# Patient Record
Sex: MALE | Race: WHITE | NOT HISPANIC OR LATINO | Employment: UNEMPLOYED | URBAN - METROPOLITAN AREA
[De-identification: names, ages, dates, MRNs, and addresses within clinical notes are randomized per-mention and may not be internally consistent; named-entity substitution may affect disease eponyms.]

---

## 2023-08-30 PROBLEM — G47.00 INSOMNIA: Status: ACTIVE | Noted: 2023-08-30

## 2023-08-30 PROBLEM — E03.9 HYPOTHYROIDISM: Status: ACTIVE | Noted: 2023-08-30

## 2023-08-30 PROBLEM — K63.89 EPIPLOIC APPENDAGITIS: Status: ACTIVE | Noted: 2023-08-30

## 2023-08-30 PROBLEM — M79.2 NEURALGIA INVOLVING SCALP: Status: ACTIVE | Noted: 2023-08-30

## 2023-08-30 PROBLEM — I63.9 CEREBROVASCULAR ACCIDENT (MULTI): Status: ACTIVE | Noted: 2023-08-30

## 2023-08-30 PROBLEM — E55.9 VITAMIN D DEFICIENCY: Status: ACTIVE | Noted: 2023-08-30

## 2023-08-30 PROBLEM — R93.89 ABNORMAL CAT SCAN: Status: ACTIVE | Noted: 2023-08-30

## 2023-08-30 PROBLEM — F32.A ANXIETY AND DEPRESSION: Status: ACTIVE | Noted: 2023-08-30

## 2023-08-30 PROBLEM — R80.9 PROTEINURIA: Status: ACTIVE | Noted: 2023-08-30

## 2023-08-30 PROBLEM — I10 HYPERTENSION: Status: ACTIVE | Noted: 2023-08-30

## 2023-08-30 PROBLEM — R91.1 PULMONARY NODULE: Status: ACTIVE | Noted: 2023-08-30

## 2023-08-30 PROBLEM — F41.9 ANXIETY AND DEPRESSION: Status: ACTIVE | Noted: 2023-08-30

## 2023-08-30 PROBLEM — E10.9 DIABETES MELLITUS TYPE 1 (MULTI): Status: ACTIVE | Noted: 2023-08-30

## 2023-08-30 PROBLEM — E78.5 HYPERLIPIDEMIA: Status: ACTIVE | Noted: 2023-08-30

## 2023-08-30 PROBLEM — E78.01 ESSENTIAL FAMILIAL HYPERCHOLESTEROLEMIA: Status: ACTIVE | Noted: 2023-08-30

## 2023-08-30 RX ORDER — INSULIN LISPRO 100 [IU]/ML
INJECTION, SOLUTION INTRAVENOUS; SUBCUTANEOUS
COMMUNITY
Start: 2021-11-03

## 2023-08-30 RX ORDER — BLOOD SUGAR DIAGNOSTIC
STRIP MISCELLANEOUS 4 TIMES DAILY
COMMUNITY
Start: 2017-06-19

## 2023-08-30 RX ORDER — INSULIN DEGLUDEC 100 U/ML
INJECTION, SOLUTION SUBCUTANEOUS NIGHTLY
COMMUNITY
End: 2024-02-13 | Stop reason: CLARIF

## 2023-08-30 RX ORDER — NIRMATRELVIR AND RITONAVIR 300-100 MG
KIT ORAL
COMMUNITY
Start: 2022-12-01 | End: 2024-05-28 | Stop reason: WASHOUT

## 2023-08-30 RX ORDER — KETOROLAC TROMETHAMINE 10 MG/1
TABLET, FILM COATED ORAL
COMMUNITY
Start: 2023-02-25 | End: 2024-05-28 | Stop reason: WASHOUT

## 2023-08-30 RX ORDER — LEVOTHYROXINE SODIUM 137 UG/1
1 TABLET ORAL DAILY
COMMUNITY
Start: 2016-08-02

## 2023-08-30 RX ORDER — POLYETHYLENE GLYCOL 3350, SODIUM CHLORIDE, SODIUM BICARBONATE, POTASSIUM CHLORIDE 420; 11.2; 5.72; 1.48 G/4L; G/4L; G/4L; G/4L
POWDER, FOR SOLUTION ORAL
COMMUNITY
Start: 2022-09-20

## 2023-08-30 RX ORDER — BENZONATATE 200 MG/1
CAPSULE ORAL
COMMUNITY
Start: 2022-12-01

## 2023-08-30 RX ORDER — INSULIN GLARGINE 100 [IU]/ML
INJECTION, SOLUTION SUBCUTANEOUS
COMMUNITY
Start: 2021-08-31 | End: 2024-02-12 | Stop reason: SDUPTHER

## 2023-08-30 RX ORDER — ATORVASTATIN CALCIUM 40 MG/1
1 TABLET, FILM COATED ORAL NIGHTLY
COMMUNITY
Start: 2012-12-18

## 2023-08-30 RX ORDER — ACETAMINOPHEN 500 MG
TABLET ORAL
COMMUNITY

## 2023-08-30 RX ORDER — URINE ACETONE TEST STRIPS
STRIP MISCELLANEOUS
COMMUNITY
Start: 2016-04-18

## 2023-10-09 ENCOUNTER — APPOINTMENT (OUTPATIENT)
Dept: ENDOCRINOLOGY | Facility: CLINIC | Age: 48
End: 2023-10-09

## 2024-02-12 DIAGNOSIS — E10.9 TYPE 1 DIABETES MELLITUS WITHOUT COMPLICATION (MULTI): Primary | ICD-10-CM

## 2024-02-12 RX ORDER — INSULIN GLARGINE 100 [IU]/ML
35 INJECTION, SOLUTION SUBCUTANEOUS NIGHTLY
Qty: 45 ML | Refills: 3 | Status: SHIPPED | OUTPATIENT
Start: 2024-02-12 | End: 2024-02-13 | Stop reason: CLARIF

## 2024-02-13 DIAGNOSIS — E10.9 TYPE 1 DIABETES MELLITUS WITHOUT COMPLICATION (MULTI): Primary | ICD-10-CM

## 2024-02-13 RX ORDER — INSULIN GLARGINE-YFGN 100 [IU]/ML
35 INJECTION, SOLUTION SUBCUTANEOUS NIGHTLY
Qty: 45 ML | Refills: 3 | Status: SHIPPED | OUTPATIENT
Start: 2024-02-13 | End: 2025-07-11

## 2024-04-29 ENCOUNTER — APPOINTMENT (OUTPATIENT)
Dept: RADIOLOGY | Facility: HOSPITAL | Age: 49
End: 2024-04-29
Payer: COMMERCIAL

## 2024-04-29 ENCOUNTER — APPOINTMENT (OUTPATIENT)
Dept: CARDIOLOGY | Facility: HOSPITAL | Age: 49
End: 2024-04-29
Payer: COMMERCIAL

## 2024-04-29 ENCOUNTER — HOSPITAL ENCOUNTER (EMERGENCY)
Facility: HOSPITAL | Age: 49
Discharge: HOME | End: 2024-04-29
Payer: COMMERCIAL

## 2024-04-29 VITALS
DIASTOLIC BLOOD PRESSURE: 83 MMHG | SYSTOLIC BLOOD PRESSURE: 112 MMHG | HEART RATE: 67 BPM | HEIGHT: 67 IN | BODY MASS INDEX: 35.31 KG/M2 | WEIGHT: 225 LBS | TEMPERATURE: 98.4 F | OXYGEN SATURATION: 97 % | RESPIRATION RATE: 16 BRPM

## 2024-04-29 DIAGNOSIS — R53.83 OTHER FATIGUE: Primary | ICD-10-CM

## 2024-04-29 LAB
ALBUMIN SERPL-MCNC: 4.2 G/DL (ref 3.5–5)
ALP BLD-CCNC: 84 U/L (ref 35–125)
ALT SERPL-CCNC: 26 U/L (ref 5–40)
ANION GAP SERPL CALC-SCNC: 9 MMOL/L
APPEARANCE UR: CLEAR
AST SERPL-CCNC: 19 U/L (ref 5–40)
BASOPHILS # BLD AUTO: 0.06 X10*3/UL (ref 0–0.1)
BASOPHILS NFR BLD AUTO: 0.8 %
BILIRUB SERPL-MCNC: 0.7 MG/DL (ref 0.1–1.2)
BILIRUB UR STRIP.AUTO-MCNC: NEGATIVE MG/DL
BUN SERPL-MCNC: 10 MG/DL (ref 8–25)
CALCIUM SERPL-MCNC: 9.7 MG/DL (ref 8.5–10.4)
CHLORIDE SERPL-SCNC: 102 MMOL/L (ref 97–107)
CO2 SERPL-SCNC: 27 MMOL/L (ref 24–31)
COLOR UR: ABNORMAL
CREAT SERPL-MCNC: 0.8 MG/DL (ref 0.4–1.6)
EGFRCR SERPLBLD CKD-EPI 2021: >90 ML/MIN/1.73M*2
EOSINOPHIL # BLD AUTO: 0.29 X10*3/UL (ref 0–0.7)
EOSINOPHIL NFR BLD AUTO: 4 %
ERYTHROCYTE [DISTWIDTH] IN BLOOD BY AUTOMATED COUNT: 12.6 % (ref 11.5–14.5)
FLUAV RNA RESP QL NAA+PROBE: NOT DETECTED
FLUBV RNA RESP QL NAA+PROBE: NOT DETECTED
GLUCOSE BLD MANUAL STRIP-MCNC: 289 MG/DL (ref 74–99)
GLUCOSE SERPL-MCNC: 311 MG/DL (ref 65–99)
GLUCOSE UR STRIP.AUTO-MCNC: ABNORMAL MG/DL
HCT VFR BLD AUTO: 52.3 % (ref 41–52)
HGB BLD-MCNC: 17.1 G/DL (ref 13.5–17.5)
IMM GRANULOCYTES # BLD AUTO: 0.02 X10*3/UL (ref 0–0.7)
IMM GRANULOCYTES NFR BLD AUTO: 0.3 % (ref 0–0.9)
KETONES UR STRIP.AUTO-MCNC: ABNORMAL MG/DL
LEUKOCYTE ESTERASE UR QL STRIP.AUTO: NEGATIVE
LIPASE SERPL-CCNC: 27 U/L (ref 16–63)
LYMPHOCYTES # BLD AUTO: 1.55 X10*3/UL (ref 1.2–4.8)
LYMPHOCYTES NFR BLD AUTO: 21.1 %
MCH RBC QN AUTO: 29.7 PG (ref 26–34)
MCHC RBC AUTO-ENTMCNC: 32.7 G/DL (ref 32–36)
MCV RBC AUTO: 91 FL (ref 80–100)
MONOCYTES # BLD AUTO: 0.5 X10*3/UL (ref 0.1–1)
MONOCYTES NFR BLD AUTO: 6.8 %
NEUTROPHILS # BLD AUTO: 4.91 X10*3/UL (ref 1.2–7.7)
NEUTROPHILS NFR BLD AUTO: 67 %
NITRITE UR QL STRIP.AUTO: NEGATIVE
NRBC BLD-RTO: 0 /100 WBCS (ref 0–0)
PH UR STRIP.AUTO: 6 [PH]
PLATELET # BLD AUTO: 255 X10*3/UL (ref 150–450)
POTASSIUM SERPL-SCNC: 4.2 MMOL/L (ref 3.4–5.1)
PROT SERPL-MCNC: 7 G/DL (ref 5.9–7.9)
PROT UR STRIP.AUTO-MCNC: NEGATIVE MG/DL
RBC # BLD AUTO: 5.75 X10*6/UL (ref 4.5–5.9)
RBC # UR STRIP.AUTO: NEGATIVE /UL
SARS-COV-2 RNA RESP QL NAA+PROBE: NOT DETECTED
SODIUM SERPL-SCNC: 138 MMOL/L (ref 133–145)
SP GR UR STRIP.AUTO: 1.03
TROPONIN T SERPL-MCNC: <6 NG/L
UROBILINOGEN UR STRIP.AUTO-MCNC: NORMAL MG/DL
WBC # BLD AUTO: 7.3 X10*3/UL (ref 4.4–11.3)

## 2024-04-29 PROCEDURE — 87636 SARSCOV2 & INF A&B AMP PRB: CPT | Performed by: NURSE PRACTITIONER

## 2024-04-29 PROCEDURE — 96361 HYDRATE IV INFUSION ADD-ON: CPT

## 2024-04-29 PROCEDURE — 93005 ELECTROCARDIOGRAM TRACING: CPT

## 2024-04-29 PROCEDURE — 85025 COMPLETE CBC W/AUTO DIFF WBC: CPT | Performed by: NURSE PRACTITIONER

## 2024-04-29 PROCEDURE — 81003 URINALYSIS AUTO W/O SCOPE: CPT | Performed by: NURSE PRACTITIONER

## 2024-04-29 PROCEDURE — 96374 THER/PROPH/DIAG INJ IV PUSH: CPT

## 2024-04-29 PROCEDURE — 99284 EMERGENCY DEPT VISIT MOD MDM: CPT | Mod: 25

## 2024-04-29 PROCEDURE — 71046 X-RAY EXAM CHEST 2 VIEWS: CPT

## 2024-04-29 PROCEDURE — 82947 ASSAY GLUCOSE BLOOD QUANT: CPT | Mod: 59

## 2024-04-29 PROCEDURE — 36415 COLL VENOUS BLD VENIPUNCTURE: CPT | Performed by: NURSE PRACTITIONER

## 2024-04-29 PROCEDURE — 2500000004 HC RX 250 GENERAL PHARMACY W/ HCPCS (ALT 636 FOR OP/ED): Performed by: NURSE PRACTITIONER

## 2024-04-29 PROCEDURE — 83690 ASSAY OF LIPASE: CPT | Performed by: NURSE PRACTITIONER

## 2024-04-29 PROCEDURE — 84484 ASSAY OF TROPONIN QUANT: CPT | Performed by: NURSE PRACTITIONER

## 2024-04-29 PROCEDURE — 80053 COMPREHEN METABOLIC PANEL: CPT | Performed by: NURSE PRACTITIONER

## 2024-04-29 PROCEDURE — 71046 X-RAY EXAM CHEST 2 VIEWS: CPT | Performed by: RADIOLOGY

## 2024-04-29 RX ORDER — ONDANSETRON HYDROCHLORIDE 2 MG/ML
4 INJECTION, SOLUTION INTRAVENOUS ONCE
Status: COMPLETED | OUTPATIENT
Start: 2024-04-29 | End: 2024-04-29

## 2024-04-29 RX ADMIN — SODIUM CHLORIDE 1000 ML: 900 INJECTION, SOLUTION INTRAVENOUS at 16:02

## 2024-04-29 RX ADMIN — ONDANSETRON 4 MG: 2 INJECTION INTRAMUSCULAR; INTRAVENOUS at 16:02

## 2024-04-29 ASSESSMENT — PAIN SCALES - GENERAL
PAINLEVEL_OUTOF10: 0 - NO PAIN
PAINLEVEL_OUTOF10: 0 - NO PAIN

## 2024-04-29 ASSESSMENT — COLUMBIA-SUICIDE SEVERITY RATING SCALE - C-SSRS
2. HAVE YOU ACTUALLY HAD ANY THOUGHTS OF KILLING YOURSELF?: NO
6. HAVE YOU EVER DONE ANYTHING, STARTED TO DO ANYTHING, OR PREPARED TO DO ANYTHING TO END YOUR LIFE?: NO
1. IN THE PAST MONTH, HAVE YOU WISHED YOU WERE DEAD OR WISHED YOU COULD GO TO SLEEP AND NOT WAKE UP?: NO

## 2024-04-29 ASSESSMENT — PAIN - FUNCTIONAL ASSESSMENT: PAIN_FUNCTIONAL_ASSESSMENT: 0-10

## 2024-04-29 NOTE — ED PROVIDER NOTES
HPI   Chief Complaint   Patient presents with    Weakness, Gen     Pt type 1 diabetic not feeling well for past few days, had syncopal episode at work bs is 289       HPI  See my MDM                  [place] Coma Scale Score: 15                     Patient History   Past Medical History:   Diagnosis Date    Acute upper respiratory infection, unspecified 10/21/2015    Acute upper respiratory infection    Personal history of other diseases of the digestive system 12/11/2018    History of gastroenteritis    Personal history of other diseases of the respiratory system 01/18/2016    History of acute sinusitis    Personal history of other diseases of the respiratory system 02/18/2017    History of influenza    Personal history of other diseases of the respiratory system 02/16/2015    History of allergic rhinitis    Personal history of other specified conditions 04/03/2018    History of fatigue     Past Surgical History:   Procedure Laterality Date    MR NECK ANGIO WO IV CONTRAST  11/16/2022    MR NECK ANGIO WO IV CONTRAST [facility]     Family History   Problem Relation Name Age of Onset    Hyperlipidemia Mother      Hyperlipidemia Father      Bipolar disorder Brother      Alcohol abuse Other multiple family members      Social History     Tobacco Use    Smoking status: Not on file    Smokeless tobacco: Not on file   Substance Use Topics    Alcohol use: Not on file    Drug use: Not on file       Physical Exam   ED Triage Vitals [04/29/24 1539]   Temperature Heart Rate Respirations BP   37 °C (98.6 °F) 72 16 116/89      Pulse Ox Temp Source Heart Rate Source Patient Position   98 % Oral Monitor Sitting      BP Location FiO2 (%)     Right arm --       Physical Exam  CONSTITUTIONAL: Vital signs reviewed as charted, well-developed and in no distress  Eyes: Extraocular muscles are intact. Pupils equal round and reactive to light. Conjunctiva are pink.    ENT: Mucous membranes are moist. Tongue in the midline. Pharynx  was without erythema or exudates, uvula midline  LUNGS: Breath sounds equal and clear to auscultation. Good air exchange, no wheezes rales or retractions, pulse oximetry is charted.  HEART: Regular rate and rhythm without murmur thrill or rub, strong tones, auscultation is normal.  ABDOMEN: Soft and nontender without guarding rebound rigidity or mass. Bowel sounds are present and normal in all quadrants. There is no palpable masses or aneurysms identified. No hepatosplenomegaly, normal abdominal exam.  Neuro: The patient is awake, alert and oriented ×3. Moving all 4 extremities and answering questions appropriately.   MUSCULOSKELETAL: The calves are nontender to palpation. Full gross active range of motion.   PSYCH: Awake alert oriented, normal mood and affect.  Skin:  Dry, normal color, warm to the touch, no rash present.      ED Course & MDM   ED Course as of 04/29/24 1816 Mon Apr 29, 2024   1546 EKG on my independent interpretation: Normal sinus rhythm 68 bpm, normal axis, normal intervals, no acute ST or T wave abnormalities [ABDELRAHMAN]      ED Course User Index  [ABDELRAHMAN] Malka Lora MD         Diagnoses as of 04/29/24 1816   Other fatigue       Medical Decision Making  History obtained from: patient    Vital signs, nursing notes, current medications, past medical history, Surgical history, allergies, social history, family History were reviewed.         HPI:  Patient 48-year-old male history of diabetes mellitus presenting emergency room today 3-day history of general fatigue.  Denies fever chills or night sweats.  Denies nausea vomiting diarrhea.  Denies cough or congestion.  Denies urinary complaints.  Vital signs within normal limits.  Nontoxic-appearing      10 point ROS was reviewed and negative except Noted above in HPI.  DDX: as listed above          MDM Summary/considerations:  EMERGENCY DEPARTMENT COURSE and DIFFERENTIAL DIAGNOSIS/MDM:        The patient presented with a chief complaint of fatigue. The  "differential diagnosis associated with this patient's presentation includes infection, viral syndrome, electrolyte abnormality.       Vitals:    Vitals:    04/29/24 1539 04/29/24 1813   BP: 116/89 112/83   BP Location: Right arm    Patient Position: Sitting    Pulse: 72 67   Resp: 16 16   Temp: 37 °C (98.6 °F) 36.9 °C (98.4 °F)   TempSrc: Oral    SpO2: 98% 97%   Weight: 102 kg (225 lb)    Height: 1.702 m (5' 7\")        ED Course as of 04/29/24 1816 Mon Apr 29, 2024   1546 EKG on my independent interpretation: Normal sinus rhythm 68 bpm, normal axis, normal intervals, no acute ST or T wave abnormalities [ABDELRAHMAN]      ED Course User Index  [ABDELRAHMAN] Malka Lora MD         Diagnoses as of 04/29/24 1816   Other fatigue       History Limited by:    None    Independent history obtained from:    None      External records reviewed:    None      Diagnostics interpreted by me:    EKG interpreted by my attending physician and Xray(s) of the chest      Discussions with other clinicians:    None      Chronic conditions impacting care:    None      Social determinants of health affecting care:    None    Diagnostic tests considered but not performed: none    ED Medications managed:    Medications   sodium chloride 0.9 % bolus 1,000 mL (0 mL intravenous Stopped 4/29/24 1702)   ondansetron (Zofran) injection 4 mg (4 mg intravenous Given 4/29/24 1602)         Prescription drugs considered:    None    Labs Reviewed   CBC WITH AUTO DIFFERENTIAL - Abnormal       Result Value    WBC 7.3      nRBC 0.0      RBC 5.75      Hemoglobin 17.1      Hematocrit 52.3 (*)     MCV 91      MCH 29.7      MCHC 32.7      RDW 12.6      Platelets 255      Neutrophils % 67.0      Immature Granulocytes %, Automated 0.3      Lymphocytes % 21.1      Monocytes % 6.8      Eosinophils % 4.0      Basophils % 0.8      Neutrophils Absolute 4.91      Immature Granulocytes Absolute, Automated 0.02      Lymphocytes Absolute 1.55      Monocytes Absolute 0.50      " Eosinophils Absolute 0.29      Basophils Absolute 0.06     COMPREHENSIVE METABOLIC PANEL - Abnormal    Glucose 311 (*)     Sodium 138      Potassium 4.2      Chloride 102      Bicarbonate 27      Urea Nitrogen 10      Creatinine 0.80      eGFR >90      Calcium 9.7      Albumin 4.2      Alkaline Phosphatase 84      Total Protein 7.0      AST 19      Bilirubin, Total 0.7      ALT 26      Anion Gap 9     URINALYSIS WITH REFLEX MICROSCOPIC - Abnormal    Color, Urine Light-Yellow      Appearance, Urine Clear      Specific Gravity, Urine 1.031      pH, Urine 6.0      Protein, Urine NEGATIVE      Glucose, Urine OVER (4+) (*)     Blood, Urine NEGATIVE      Ketones, Urine TRACE (*)     Bilirubin, Urine NEGATIVE      Urobilinogen, Urine Normal      Nitrite, Urine NEGATIVE      Leukocyte Esterase, Urine NEGATIVE     POCT GLUCOSE - Abnormal    POCT Glucose 289 (*)    LIPASE - Normal    Lipase 27     SARS-COV-2 PCR - Normal    Coronavirus 2019, PCR Not Detected      Narrative:     This assay has received FDA Emergency Use Authorization (EUA) and is only authorized for the duration of time that circumstances exist to justify the authorization of the emergency use of in vitro diagnostic tests for the detection of SARS-CoV-2 virus and/or diagnosis of COVID-19 infection under section 564(b)(1) of the Act, 21 U.S.C. 360bbb-3(b)(1). This assay is an in vitro diagnostic nucleic acid amplification test for the qualitative detection of SARS-CoV-2 from nasopharyngeal specimens and has been validated for use at [facility]. Negative results do not preclude COVID-19 infections and should not be used as the sole basis for diagnosis, treatment, or other management decisions.     INFLUENZA A AND B PCR - Normal    Flu A Result Not Detected      Flu B Result Not Detected      Narrative:     This assay is an in vitro diagnostic multiplex nucleic acid amplification test for the detection and discrimination of Influenza A & B  from nasopharyngeal specimens, and has been validated for use at [facility]. Negative results do not preclude Influenza A/B infections, and should not be used as the sole basis for diagnosis, treatment, or other management decisions. If Influenza A/B and RSV PCR results are negative, testing for Parainfluenza virus, Adenovirus and Metapneumovirus is routinely performed for [facility] pediatric oncology and intensive care inpatients, and is available on other patients by placing an add-on request.   SERIAL TROPONIN, INITIAL ([facility]) - Normal    Troponin T, High Sensitivity <6     TROPONIN T SERIES, HIGH SENSITIVITY (0, 2 HR, 6 HR)    Narrative:     The following orders were created for panel order Troponin T Series, High Sensitivity (0, 2HR, 6HR).  Procedure                               Abnormality         Status                     ---------                               -----------         ------                     Serial Troponin, Initial...[[id number]]  Normal              Final result               Serial Troponin, 2 Hour ...[[id number]]                                                   Please view results for these tests on the individual orders.   SERIAL TROPONIN,  2 HOUR ([facility])     XR chest 2 views   Final Result   1.  No evidence of acute cardiopulmonary process.                  MACRO:   None        Signed by: Edilia Brooks 4/29/2024 4:15 PM   Dictation workstation:   [id number]        Medications   sodium chloride 0.9 % bolus 1,000 mL (0 mL intravenous Stopped 4/29/24 1702)   ondansetron (Zofran) injection 4 mg (4 mg intravenous Given 4/29/24 1602)     Discharge Medication List as of 4/29/2024  6:14 PM        I estimate there is LOW risk for EPIGLOTTITIS, PNEUMONIA, MENINGITIS, OR URINARY TRACT INFECTION, thus I consider the discharge disposition reasonable. Also, there is no evidence for peritonitis, sepsis, or toxicity. We have discussed the diagnosis and risks, and we agree with  discharging home to follow-up with their primary doctor. We also discussed returning to the Emergency Department immediately if new or worsening symptoms occur. We have discussed the symptoms which are most concerning (e.g., changing or worsening pain, trouble swallowing or breathing, neck stiffness, fever) that necessitate immediate return.    Patient's laboratory assessment was grossly unremarkable including electrolytes and blood counts.  Negative urinalysis.  Negative chest x-ray completed here in the ED nonischemic EKG, negative troponin.  Patient be discharged home to follow-up PCP 1 to 2 days for reevaluation.  Was feeling somewhat better after the IV fluids.    All of the patient's questions were answered to the best of my ability.  Patient states understanding that they have been screened for an emergency today and we have not found any etiology of symptoms that requires emergent treatment or admission to the hospital at this point. They understand that they have not had definitive care day and require follow-up for treatment of their condition. They also state understanding that they may have an emergent condition that may potentially have not of detected at this visit and they must return to the emergency department if they develop any worsening of symptoms or new complaints.      Discussed H&P with supervising physician, aware of results and agrees with plan/ disposition.        Critical Care: Not warranted at this time        This chart was completed using voice recognition transcription software. Please excuse any errors of transcription including grammatical, punctuation, syntax and spelling errors.  Please contact me with any questions regarding this chart.    Procedure  Procedures     AUGIE Quintanilla-CNP  04/29/24 3301

## 2024-05-01 ENCOUNTER — LAB (OUTPATIENT)
Dept: LAB | Facility: LAB | Age: 49
End: 2024-05-01
Payer: COMMERCIAL

## 2024-05-01 ENCOUNTER — OFFICE VISIT (OUTPATIENT)
Dept: PRIMARY CARE | Facility: CLINIC | Age: 49
End: 2024-05-01
Payer: COMMERCIAL

## 2024-05-01 VITALS
HEIGHT: 67 IN | DIASTOLIC BLOOD PRESSURE: 72 MMHG | BODY MASS INDEX: 32.02 KG/M2 | SYSTOLIC BLOOD PRESSURE: 124 MMHG | WEIGHT: 204 LBS

## 2024-05-01 DIAGNOSIS — E03.9 HYPOTHYROIDISM, UNSPECIFIED TYPE: ICD-10-CM

## 2024-05-01 DIAGNOSIS — I10 PRIMARY HYPERTENSION: ICD-10-CM

## 2024-05-01 DIAGNOSIS — R55 SYNCOPE, UNSPECIFIED SYNCOPE TYPE: ICD-10-CM

## 2024-05-01 DIAGNOSIS — F43.9 STRESS: Primary | ICD-10-CM

## 2024-05-01 DIAGNOSIS — E10.9 TYPE 1 DIABETES MELLITUS WITHOUT COMPLICATION (MULTI): ICD-10-CM

## 2024-05-01 DIAGNOSIS — Z12.5 ENCOUNTER FOR PROSTATE CANCER SCREENING: ICD-10-CM

## 2024-05-01 DIAGNOSIS — F41.9 ANXIETY: ICD-10-CM

## 2024-05-01 DIAGNOSIS — E78.49 OTHER HYPERLIPIDEMIA: ICD-10-CM

## 2024-05-01 LAB
ALBUMIN SERPL BCP-MCNC: 4.1 G/DL (ref 3.4–5)
ALP SERPL-CCNC: 72 U/L (ref 33–120)
ALT SERPL W P-5'-P-CCNC: 28 U/L (ref 10–52)
AMMONIA PLAS-SCNC: 19 UMOL/L (ref 16–53)
ANION GAP SERPL CALC-SCNC: 10 MMOL/L (ref 10–20)
AST SERPL W P-5'-P-CCNC: 20 U/L (ref 9–39)
BILIRUB SERPL-MCNC: 0.6 MG/DL (ref 0–1.2)
BUN SERPL-MCNC: 9 MG/DL (ref 6–23)
CALCIUM SERPL-MCNC: 9.7 MG/DL (ref 8.6–10.6)
CHLORIDE SERPL-SCNC: 107 MMOL/L (ref 98–107)
CHOLEST SERPL-MCNC: 143 MG/DL (ref 0–199)
CHOLESTEROL/HDL RATIO: 2.9
CO2 SERPL-SCNC: 30 MMOL/L (ref 21–32)
CREAT SERPL-MCNC: 0.88 MG/DL (ref 0.5–1.3)
EGFRCR SERPLBLD CKD-EPI 2021: >90 ML/MIN/1.73M*2
GLUCOSE SERPL-MCNC: 156 MG/DL (ref 74–99)
HDLC SERPL-MCNC: 49.4 MG/DL
LDLC SERPL CALC-MCNC: 85 MG/DL
NON HDL CHOLESTEROL: 94 MG/DL (ref 0–149)
POTASSIUM SERPL-SCNC: 4.2 MMOL/L (ref 3.5–5.3)
PROT SERPL-MCNC: 6.4 G/DL (ref 6.4–8.2)
PSA SERPL-MCNC: 1.15 NG/ML
SODIUM SERPL-SCNC: 143 MMOL/L (ref 136–145)
TRIGL SERPL-MCNC: 45 MG/DL (ref 0–149)
TSH SERPL-ACNC: 1.18 MIU/L (ref 0.44–3.98)
VLDL: 9 MG/DL (ref 0–40)

## 2024-05-01 PROCEDURE — 82043 UR ALBUMIN QUANTITATIVE: CPT

## 2024-05-01 PROCEDURE — 84153 ASSAY OF PSA TOTAL: CPT

## 2024-05-01 PROCEDURE — 3074F SYST BP LT 130 MM HG: CPT | Performed by: INTERNAL MEDICINE

## 2024-05-01 PROCEDURE — 3048F LDL-C <100 MG/DL: CPT | Performed by: INTERNAL MEDICINE

## 2024-05-01 PROCEDURE — 84443 ASSAY THYROID STIM HORMONE: CPT

## 2024-05-01 PROCEDURE — 80053 COMPREHEN METABOLIC PANEL: CPT

## 2024-05-01 PROCEDURE — 82140 ASSAY OF AMMONIA: CPT

## 2024-05-01 PROCEDURE — 36415 COLL VENOUS BLD VENIPUNCTURE: CPT

## 2024-05-01 PROCEDURE — 85027 COMPLETE CBC AUTOMATED: CPT

## 2024-05-01 PROCEDURE — 83036 HEMOGLOBIN GLYCOSYLATED A1C: CPT

## 2024-05-01 PROCEDURE — 80061 LIPID PANEL: CPT

## 2024-05-01 PROCEDURE — 3051F HG A1C>EQUAL 7.0%<8.0%: CPT | Performed by: INTERNAL MEDICINE

## 2024-05-01 PROCEDURE — 3062F POS MACROALBUMINURIA REV: CPT | Performed by: INTERNAL MEDICINE

## 2024-05-01 PROCEDURE — 81003 URINALYSIS AUTO W/O SCOPE: CPT

## 2024-05-01 PROCEDURE — 99214 OFFICE O/P EST MOD 30 MIN: CPT | Performed by: INTERNAL MEDICINE

## 2024-05-01 PROCEDURE — 82570 ASSAY OF URINE CREATININE: CPT

## 2024-05-01 PROCEDURE — 3078F DIAST BP <80 MM HG: CPT | Performed by: INTERNAL MEDICINE

## 2024-05-01 RX ORDER — HYDROXYZINE PAMOATE 25 MG/1
25 CAPSULE ORAL 3 TIMES DAILY PRN
Qty: 30 CAPSULE | Refills: 0 | Status: SHIPPED | OUTPATIENT
Start: 2024-05-01 | End: 2024-05-11

## 2024-05-01 RX ORDER — ESCITALOPRAM OXALATE 10 MG/1
10 TABLET ORAL DAILY
Qty: 30 TABLET | Refills: 0 | Status: SHIPPED | OUTPATIENT
Start: 2024-05-01 | End: 2024-10-28

## 2024-05-01 ASSESSMENT — ENCOUNTER SYMPTOMS
OCCASIONAL FEELINGS OF UNSTEADINESS: 0
DEPRESSION: 0
LOSS OF SENSATION IN FEET: 0

## 2024-05-01 NOTE — LETTER
May 1, 2024     Patient: Ron Connolly   YOB: 1975   Date of Visit: 5/1/2024       To Whom It May Concern:    Ron Connolly was seen in my clinic on 5/1/2024 at 10:15 am. Please excuse Ron for his absence from work on 05/01/2024 through 05/03/2024. Ron may return on 05/06/2024.    If you have any questions or concerns, please don't hesitate to call.         Sincerely,         Yann Menchaca MD

## 2024-05-01 NOTE — PROGRESS NOTES
"Subjective   Patient ID: Ron Connolly is a 48 y.o. male who presents for multiple medical issues.    This 48-year-old young man today came here for multiple medical issues.  He had a syncopal episode in the emergency room at work.  He is an .  He had a syncopal episode.  He went to the emergency room.  He was discharged.  He came here for follow-up.  He has extreme weakness, tired, fatigued, and exhausted.  Anxiety and stress coming back.  In the past he took Lexapro, it did work.    I have personally reviewed the patient's Past Medical History, Medications, Allergies, Social History, and Family History in the EMR.    Review of Systems   All other systems reviewed and are negative.    Objective   /72   Ht 1.702 m (5' 7\")   Wt 92.5 kg (204 lb)   BMI 31.95 kg/m²     Physical Exam  Vitals reviewed.   HENT:      Right Ear: Tympanic membrane, ear canal and external ear normal.      Left Ear: Tympanic membrane, ear canal and external ear normal.   Eyes:      General: No scleral icterus.     Pupils: Pupils are equal, round, and reactive to light.   Neck:      Vascular: No carotid bruit.   Cardiovascular:      Heart sounds: Normal heart sounds, S1 normal and S2 normal. No murmur heard.     No friction rub.   Pulmonary:      Effort: Pulmonary effort is normal.      Breath sounds: Normal breath sounds and air entry.   Abdominal:      Palpations: There is no hepatomegaly, splenomegaly or mass.   Musculoskeletal:         General: No swelling or deformity. Normal range of motion.      Cervical back: Neck supple.      Right lower leg: No edema.      Left lower leg: No edema.   Lymphadenopathy:      Cervical: No cervical adenopathy.      Upper Body:      Right upper body: No axillary adenopathy.      Left upper body: No axillary adenopathy.      Lower Body: No right inguinal adenopathy. No left inguinal adenopathy.   Neurological:      Mental Status: He is oriented to person, place, and time.      Cranial " Nerves: Cranial nerves 2-12 are intact. No cranial nerve deficit.      Sensory: No sensory deficit.      Motor: Motor function is intact. No weakness.      Gait: Gait is intact.      Deep Tendon Reflexes: Reflexes normal.      Comments: Romberg sign positive sway.  Questionable nystagmus.   Psychiatric:         Mood and Affect: Mood normal. Mood is not anxious or depressed. Affect is not angry.         Behavior: Behavior is not agitated.         Thought Content: Thought content normal.         Judgment: Judgment normal.     LAB WORK:  Laboratory testing discussed.    Assessment/Plan   Problem List Items Addressed This Visit             ICD-10-CM       Cardiac and Vasculature    Hyperlipidemia E78.5    Relevant Orders    Comprehensive Metabolic Panel    Lipid Panel    Hypertension I10    Relevant Orders    Ammonia    Albumin , Urine Random    CBC    Urinalysis with Reflex Microscopic       Endocrine/Metabolic    Diabetes mellitus type 1 (Multi) E10.9    Relevant Orders    Hemoglobin A1C    Hypothyroidism E03.9    Relevant Orders    Thyroid Stimulating Hormone     Other Visit Diagnoses         Codes    Stress    -  Primary F43.9    Syncope, unspecified syncope type     R55    Relevant Orders    Transthoracic Echo Complete    MR brain wo IV contrast    Anxiety     F41.9    Relevant Medications    escitalopram (Lexapro) 10 mg tablet    hydrOXYzine pamoate (VistariL) 25 mg capsule    Encounter for prostate cancer screening     Z12.5    Relevant Orders    Prostate Specific Antigen, Screen        1. Syncope, passing out.  Workup not done.  I think we should do 2D echo, Holter monitor, MRI of the brain to address the stroke.  Whole thing was done in 2022, it was benign, but is episodic.  I think we should do that.  At the moment he can drive.  2. Anxiety and stress.  Lexapro was started.  3. Type 1 diabetic.  He sees a specialist.  I will still check hemoglobin A1c.  4. Hypothyroid.  We will check thyroid.  5. Cardiac risk.   Workup ordered.  6. Regular with eye checkup.  7. I shall see him in about two weeks after above test.  Welcome back.    Scribe Attestation  By signing my name below, I, Eros Zheng attest that this documentation has been prepared under the direction and in the presence of Yann Menchaca MD.

## 2024-05-02 DIAGNOSIS — R55 SYNCOPE, UNSPECIFIED SYNCOPE TYPE: ICD-10-CM

## 2024-05-02 LAB
APPEARANCE UR: CLEAR
BILIRUB UR STRIP.AUTO-MCNC: NEGATIVE MG/DL
COLOR UR: ABNORMAL
CREAT UR-MCNC: 93.5 MG/DL (ref 20–370)
ERYTHROCYTE [DISTWIDTH] IN BLOOD BY AUTOMATED COUNT: 12.6 % (ref 11.5–14.5)
EST. AVERAGE GLUCOSE BLD GHB EST-MCNC: 180 MG/DL
GLUCOSE UR STRIP.AUTO-MCNC: ABNORMAL MG/DL
HBA1C MFR BLD: 7.9 %
HCT VFR BLD AUTO: 52.5 % (ref 41–52)
HGB BLD-MCNC: 16.7 G/DL (ref 13.5–17.5)
KETONES UR STRIP.AUTO-MCNC: NEGATIVE MG/DL
LEUKOCYTE ESTERASE UR QL STRIP.AUTO: NEGATIVE
MCH RBC QN AUTO: 29.7 PG (ref 26–34)
MCHC RBC AUTO-ENTMCNC: 31.8 G/DL (ref 32–36)
MCV RBC AUTO: 93 FL (ref 80–100)
MICROALBUMIN UR-MCNC: <7 MG/L
MICROALBUMIN/CREAT UR: NORMAL MG/G{CREAT}
NITRITE UR QL STRIP.AUTO: NEGATIVE
NRBC BLD-RTO: 0 /100 WBCS (ref 0–0)
PH UR STRIP.AUTO: 7 [PH]
PLATELET # BLD AUTO: 260 X10*3/UL (ref 150–450)
PROT UR STRIP.AUTO-MCNC: NEGATIVE MG/DL
RBC # BLD AUTO: 5.62 X10*6/UL (ref 4.5–5.9)
RBC # UR STRIP.AUTO: NEGATIVE /UL
SP GR UR STRIP.AUTO: 1.02
UROBILINOGEN UR STRIP.AUTO-MCNC: NORMAL MG/DL
WBC # BLD AUTO: 5.7 X10*3/UL (ref 4.4–11.3)

## 2024-05-06 LAB
ATRIAL RATE: 68 BPM
P AXIS: 42 DEGREES
P OFFSET: 179 MS
P ONSET: 133 MS
PR INTERVAL: 188 MS
Q ONSET: 227 MS
QRS COUNT: 11 BEATS
QRS DURATION: 72 MS
QT INTERVAL: 358 MS
QTC CALCULATION(BAZETT): 380 MS
QTC FREDERICIA: 373 MS
R AXIS: 33 DEGREES
T AXIS: 50 DEGREES
T OFFSET: 406 MS
VENTRICULAR RATE: 68 BPM

## 2024-05-09 ENCOUNTER — APPOINTMENT (OUTPATIENT)
Dept: CARDIOLOGY | Facility: CLINIC | Age: 49
End: 2024-05-09
Payer: COMMERCIAL

## 2024-05-20 ENCOUNTER — APPOINTMENT (OUTPATIENT)
Dept: CARDIOLOGY | Facility: CLINIC | Age: 49
End: 2024-05-20
Payer: COMMERCIAL

## 2024-05-21 ENCOUNTER — APPOINTMENT (OUTPATIENT)
Dept: ENDOCRINOLOGY | Facility: CLINIC | Age: 49
End: 2024-05-21
Payer: COMMERCIAL

## 2024-05-21 ENCOUNTER — APPOINTMENT (OUTPATIENT)
Dept: RADIOLOGY | Facility: CLINIC | Age: 49
End: 2024-05-21
Payer: COMMERCIAL

## 2024-05-28 ENCOUNTER — APPOINTMENT (OUTPATIENT)
Dept: ENDOCRINOLOGY | Facility: CLINIC | Age: 49
End: 2024-05-28
Payer: COMMERCIAL

## 2024-05-28 ENCOUNTER — OFFICE VISIT (OUTPATIENT)
Dept: ENDOCRINOLOGY | Facility: CLINIC | Age: 49
End: 2024-05-28
Payer: COMMERCIAL

## 2024-05-28 VITALS
WEIGHT: 207 LBS | SYSTOLIC BLOOD PRESSURE: 125 MMHG | HEART RATE: 88 BPM | DIASTOLIC BLOOD PRESSURE: 85 MMHG | BODY MASS INDEX: 32.42 KG/M2

## 2024-05-28 DIAGNOSIS — E10.9 TYPE 1 DIABETES MELLITUS WITHOUT COMPLICATION (MULTI): Primary | ICD-10-CM

## 2024-05-28 DIAGNOSIS — E03.9 HYPOTHYROIDISM, UNSPECIFIED TYPE: ICD-10-CM

## 2024-05-28 PROCEDURE — 3051F HG A1C>EQUAL 7.0%<8.0%: CPT | Performed by: INTERNAL MEDICINE

## 2024-05-28 PROCEDURE — 1036F TOBACCO NON-USER: CPT | Performed by: INTERNAL MEDICINE

## 2024-05-28 PROCEDURE — 3074F SYST BP LT 130 MM HG: CPT | Performed by: INTERNAL MEDICINE

## 2024-05-28 PROCEDURE — 99214 OFFICE O/P EST MOD 30 MIN: CPT | Performed by: INTERNAL MEDICINE

## 2024-05-28 PROCEDURE — 3048F LDL-C <100 MG/DL: CPT | Performed by: INTERNAL MEDICINE

## 2024-05-28 PROCEDURE — 3079F DIAST BP 80-89 MM HG: CPT | Performed by: INTERNAL MEDICINE

## 2024-05-28 PROCEDURE — 3062F POS MACROALBUMINURIA REV: CPT | Performed by: INTERNAL MEDICINE

## 2024-05-28 RX ORDER — ACETAMINOPHEN 500 MG
5-10 TABLET ORAL NIGHTLY PRN
COMMUNITY
Start: 2024-05-07

## 2024-05-28 RX ORDER — BUPROPION HYDROCHLORIDE 150 MG/1
150 TABLET ORAL
COMMUNITY
Start: 2024-05-07

## 2024-05-28 RX ORDER — LORAZEPAM 0.5 MG/1
TABLET ORAL
COMMUNITY
Start: 2024-05-07

## 2024-05-28 ASSESSMENT — ENCOUNTER SYMPTOMS
ARTHRALGIAS: 1
DYSPHORIC MOOD: 1
FREQUENCY: 0
POLYDIPSIA: 0
DIZZINESS: 1
VOMITING: 0
CONSTIPATION: 0
MYALGIAS: 1
COUGH: 0
NAUSEA: 0
FATIGUE: 1
ABDOMINAL PAIN: 0
SHORTNESS OF BREATH: 0
SORE THROAT: 0
APPETITE CHANGE: 0
NERVOUS/ANXIOUS: 1
HEADACHES: 0
DIARRHEA: 0
FEVER: 0

## 2024-05-28 NOTE — PATIENT INSTRUCTIONS
RECOMMENDATIONS  Semglee 37 units at bedtime.       Humalog  Before Breakfast 1 unit per 15 grams carb   Before Lunch 1 unit per 15 grams carb  Before Dinner 1 unit per 7 grams carb    Continue Levothyroxine 137 mcg/day  Take levothyroxine on an empty stomach with water alone, 1 hour before eating or taking other medications, 4 hours before any calcium or iron supplement.    Follow up 3 months  A1c at next appointment

## 2024-05-28 NOTE — PROGRESS NOTES
History Of Present Illness  Ron Connolly is a 48 y.o. male     Duration of type 1 diabetes mellitus:  37 years  Complications:  none    Increased anxiety, panic attacks  History of dizzy spells, ED visit for syncope    Semglee 37 units at bedtime.   History of hypoglycemia at 40 units    Humalog  Before Breakfast 1 unit per 12 grams carb   Before Lunch 1 unit per 15 grams carb  Before Dinner 1 unit per 7 grams carb    Patient is testing glucose 4 times daily  Records not provided    Last eye exam:  1/23/24    Past Medical History  He has a past medical history of Acute upper respiratory infection, unspecified (10/21/2015), High cholesterol, Hypertension, Hypothyroid, Personal history of other diseases of the digestive system (12/11/2018), Personal history of other diseases of the respiratory system (01/18/2016), Personal history of other diseases of the respiratory system (02/18/2017), Personal history of other diseases of the respiratory system (02/16/2015), Personal history of other specified conditions (04/03/2018), and Type 1 diabetes (Multi).    Surgical History  He has a past surgical history that includes MR angio neck wo IV contrast (11/16/2022).     Social History  He reports that he has never smoked. He has never used smokeless tobacco. He reports that he does not drink alcohol and does not use drugs.    Family History  Family History   Problem Relation Name Age of Onset    Hyperlipidemia Mother      Hyperlipidemia Father      Bipolar disorder Brother      Alcohol abuse Other multiple family members        Medications  Current Outpatient Medications   Medication Instructions    acetaminophen (Tylenol) 500 mg tablet oral    acetone, urine, test (Ketostix) strip USE AS DIRECTED for ketone testing if glucose over 250 mg/dl.    atorvastatin (Lipitor) 40 mg tablet 1 tablet, oral, Nightly    benzonatate (Tessalon) 200 mg capsule oral    buPROPion XL (WELLBUTRIN XL) 150 mg, oral, Daily before breakfast     escitalopram (LEXAPRO) 10 mg, oral, Daily    HumaLOG U-100 Insulin 100 unit/mL injection  INJECT 8 to 10 UNIT 3 times daily    hydrOXYzine pamoate (VISTARIL) 25 mg, oral, 3 times daily PRN    insulin glargine-yfgn (SEMGLEE(INSULIN GLARG-YFGN)PEN) 35 Units, subcutaneous, Nightly, Take as directed per insulin instructions.    levothyroxine (Synthroid, Levoxyl) 137 mcg tablet 1 tablet, oral, Daily    LORazepam (Ativan) 0.5 mg tablet take one tablet by moth one time per day as needed for panic for 30 days    melatonin 5-10 mg, oral, Nightly PRN, FOR INSOMNIA    OneTouch Ultra Test strip 4 times daily    polyethylene glycol-electrolytes 420 gram solution oral       Allergies  Patient has no known allergies.    Review of Systems   Constitutional:  Positive for fatigue. Negative for appetite change and fever.   HENT:  Positive for dental problem. Negative for sore throat.         Denies dry mouth   Eyes:  Negative for visual disturbance.   Respiratory:  Negative for cough and shortness of breath.    Cardiovascular:  Negative for chest pain.   Gastrointestinal:  Negative for abdominal pain, constipation, diarrhea, nausea and vomiting.   Endocrine: Negative for polydipsia and polyuria.   Genitourinary:  Negative for frequency.   Musculoskeletal:  Positive for arthralgias and myalgias.   Skin:  Negative for rash.   Neurological:  Positive for dizziness. Negative for headaches.   Psychiatric/Behavioral:  Positive for dysphoric mood. The patient is nervous/anxious.          Last Recorded Vitals  Blood pressure (!) 149/101, pulse 87, weight 93.9 kg (207 lb).    Physical Exam  Constitutional:       General: He is not in acute distress.  HENT:      Head: Normocephalic.      Mouth/Throat:      Mouth: Mucous membranes are moist.   Eyes:      Extraocular Movements:      Right eye: Abnormal extraocular motion (lateral deviation) present.   Neck:      Thyroid: No thyroid mass or thyromegaly.   Cardiovascular:      Pulses:            Radial pulses are 2+ on the right side and 2+ on the left side.   Musculoskeletal:      Right lower leg: No edema.      Left lower leg: No edema.   Feet:      Comments: Flat feet  Lymphadenopathy:      Cervical: No cervical adenopathy.   Skin:     Comments: No foot sores   Neurological:      Mental Status: He is alert.      Motor: No tremor.   Psychiatric:         Mood and Affect: Affect normal.          Relevant Results  Glucose   Date Value   05/01/2024 156 mg/dL (H)   04/29/2024 311 mg/dL (H)   11/17/2022 173 MG/DL (H)   11/16/2022 89 MG/DL   08/15/2022 122 mg/dL (H)     Hemoglobin A1C (%)   Date Value   05/01/2024 7.9 (H)   08/15/2022 7.8 (A)   06/01/2021 8.5   05/25/2020 6.9 (H)   04/11/2020 8.7     Bicarbonate   Date Value   05/01/2024 30 mmol/L   04/29/2024 27 mmol/L   11/17/2022 22 MMOL/L (L)   11/16/2022 22 MMOL/L (L)   08/15/2022 26 mmol/L     Urea Nitrogen   Date Value   05/01/2024 9 mg/dL   04/29/2024 10 mg/dL   11/17/2022 15 MG/DL   11/16/2022 15 MG/DL   08/15/2022 13 mg/dL     Creatinine   Date Value   05/01/2024 0.88 mg/dL   04/29/2024 0.80 mg/dL   11/17/2022 0.9 MG/DL   11/16/2022 0.9 MG/DL   08/15/2022 0.88 mg/dL     Lab Results   Component Value Date    CHOL 143 05/01/2024    CHOL 242 (H) 08/15/2022    CHOL 140 09/03/2021     Lab Results   Component Value Date    HDL 49.4 05/01/2024    HDL 53.9 08/15/2022    HDL 41.4 09/03/2021     Lab Results   Component Value Date    LDLCALC 85 05/01/2024     Lab Results   Component Value Date    TRIG 45 05/01/2024    TRIG 78 08/15/2022    TRIG 80 06/01/2021     Lab Results   Component Value Date    TSH 1.18 05/01/2024     Lab Results   Component Value Date    ALBUR <7.0 06/14/2021    [id number] <14 06/14/2021          IMPRESSION  TYPE 1 DIABETES MELLITUS  A1c Elevated since last year  Pre-lunch hypoglycemia      HYPOTHYROIDISM  Euthyroid on Levothyroxine 137 mcg/day      RECOMMENDATIONS  Semglee 37 units at bedtime.       Humalog  Before Breakfast 1 unit per 15  grams carb   Before Lunch 1 unit per 15 grams carb  Before Dinner 1 unit per 7 grams carb    Continue Levothyroxine 137 mcg/day  Take levothyroxine on an empty stomach with water alone, 1 hour before eating or taking other medications, 4 hours before any calcium or iron supplement.    Follow up 3 months  A1c at next appointment

## 2024-06-03 ENCOUNTER — APPOINTMENT (OUTPATIENT)
Dept: ENDOCRINOLOGY | Facility: CLINIC | Age: 49
End: 2024-06-03
Payer: COMMERCIAL

## 2024-08-26 ENCOUNTER — APPOINTMENT (OUTPATIENT)
Dept: ENDOCRINOLOGY | Facility: CLINIC | Age: 49
End: 2024-08-26
Payer: COMMERCIAL

## 2024-08-26 VITALS
BODY MASS INDEX: 35.71 KG/M2 | DIASTOLIC BLOOD PRESSURE: 82 MMHG | HEART RATE: 89 BPM | SYSTOLIC BLOOD PRESSURE: 130 MMHG | WEIGHT: 228 LBS

## 2024-08-26 DIAGNOSIS — E03.9 HYPOTHYROIDISM, UNSPECIFIED TYPE: ICD-10-CM

## 2024-08-26 DIAGNOSIS — E10.9 TYPE 1 DIABETES MELLITUS WITHOUT COMPLICATION (MULTI): Primary | ICD-10-CM

## 2024-08-26 DIAGNOSIS — E78.5 HYPERLIPIDEMIA, UNSPECIFIED HYPERLIPIDEMIA TYPE: ICD-10-CM

## 2024-08-26 LAB — POC HEMOGLOBIN A1C: 6.9 % (ref 4.2–6.5)

## 2024-08-26 PROCEDURE — 3079F DIAST BP 80-89 MM HG: CPT | Performed by: INTERNAL MEDICINE

## 2024-08-26 PROCEDURE — 83036 HEMOGLOBIN GLYCOSYLATED A1C: CPT | Performed by: INTERNAL MEDICINE

## 2024-08-26 PROCEDURE — 1036F TOBACCO NON-USER: CPT | Performed by: INTERNAL MEDICINE

## 2024-08-26 PROCEDURE — 3051F HG A1C>EQUAL 7.0%<8.0%: CPT | Performed by: INTERNAL MEDICINE

## 2024-08-26 PROCEDURE — 3062F POS MACROALBUMINURIA REV: CPT | Performed by: INTERNAL MEDICINE

## 2024-08-26 PROCEDURE — 3075F SYST BP GE 130 - 139MM HG: CPT | Performed by: INTERNAL MEDICINE

## 2024-08-26 PROCEDURE — 3048F LDL-C <100 MG/DL: CPT | Performed by: INTERNAL MEDICINE

## 2024-08-26 PROCEDURE — 99214 OFFICE O/P EST MOD 30 MIN: CPT | Performed by: INTERNAL MEDICINE

## 2024-08-26 RX ORDER — LEVOTHYROXINE SODIUM 137 UG/1
137 TABLET ORAL DAILY
Qty: 90 TABLET | Refills: 3 | Status: SHIPPED | OUTPATIENT
Start: 2024-08-26 | End: 2025-08-26

## 2024-08-26 RX ORDER — INSULIN GLARGINE 100 [IU]/ML
35 INJECTION, SOLUTION SUBCUTANEOUS EVERY 24 HOURS
Qty: 40 ML | Refills: 3 | Status: SHIPPED | OUTPATIENT
Start: 2024-08-26

## 2024-08-26 RX ORDER — INSULIN LISPRO 100 [IU]/ML
8-10 INJECTION, SOLUTION INTRAVENOUS; SUBCUTANEOUS
Qty: 30 ML | Refills: 3 | Status: SHIPPED | OUTPATIENT
Start: 2024-08-26

## 2024-08-26 RX ORDER — ATORVASTATIN CALCIUM 40 MG/1
40 TABLET, FILM COATED ORAL NIGHTLY
Qty: 90 TABLET | Refills: 3 | Status: SHIPPED | OUTPATIENT
Start: 2024-08-26 | End: 2025-08-26

## 2024-08-26 ASSESSMENT — ENCOUNTER SYMPTOMS
APPETITE CHANGE: 0
MYALGIAS: 1
HEADACHES: 0
SORE THROAT: 0
UNEXPECTED WEIGHT CHANGE: 1
POLYDIPSIA: 0
COUGH: 0
ABDOMINAL PAIN: 0
CONSTIPATION: 0
FEVER: 0
ARTHRALGIAS: 1
NERVOUS/ANXIOUS: 1
FREQUENCY: 0
FATIGUE: 1
VOMITING: 0
NAUSEA: 0
DIZZINESS: 1
SHORTNESS OF BREATH: 0
DIARRHEA: 0
DYSPHORIC MOOD: 1

## 2024-08-26 NOTE — PROGRESS NOTES
History Of Present Illness  Ron Connolly is a 49 y.o. male     Duration of type 1 diabetes mellitus:  38 years  Complications:  none      Semglee 37 to 35 units at bedtime.   Changing back to Lantus on new prescription.     Humalog  Before Breakfast 1 unit per 15 grams carb   Before Lunch 1 unit per 15 grams carb  Before Dinner 1 unit per 7 grams carb     Patient is testing glucose 4 times daily  Records reviewed, on file     Last eye exam:  1/23/24    Past Medical History  He has a past medical history of Acute upper respiratory infection, unspecified (10/21/2015), High cholesterol, Hypertension, Hypothyroid, Personal history of other diseases of the digestive system (12/11/2018), Personal history of other diseases of the respiratory system (01/18/2016), Personal history of other diseases of the respiratory system (02/18/2017), Personal history of other diseases of the respiratory system (02/16/2015), Personal history of other specified conditions (04/03/2018), and Type 1 diabetes (Multi).    Surgical History  He has a past surgical history that includes MR angio neck wo IV contrast (11/16/2022).     Social History  He reports that he has never smoked. He has never used smokeless tobacco. He reports that he does not drink alcohol and does not use drugs.    Family History  Family History   Problem Relation Name Age of Onset    Hyperlipidemia Mother      Hyperlipidemia Father      Bipolar disorder Brother      Alcohol abuse Other multiple family members        Medications  Current Outpatient Medications   Medication Instructions    acetaminophen (Tylenol) 500 mg tablet oral    acetone, urine, test (Ketostix) strip USE AS DIRECTED for ketone testing if glucose over 250 mg/dl.    atorvastatin (Lipitor) 40 mg tablet 1 tablet, oral, Nightly    buPROPion XL (WELLBUTRIN XL) 150 mg, oral, Daily before breakfast    escitalopram (LEXAPRO) 10 mg, oral, Daily    HumaLOG U-100 Insulin 100 unit/mL injection  INJECT 8 to 10  UNIT 3 times daily    insulin glargine-yfgn (SEMGLEE(INSULIN GLARG-YFGN)PEN) 35 Units, subcutaneous, Nightly, Take as directed per insulin instructions.    levothyroxine (Synthroid, Levoxyl) 137 mcg tablet 1 tablet, oral, Daily    LORazepam (Ativan) 0.5 mg tablet take one tablet by moth one time per day as needed for panic for 30 days    melatonin 5-10 mg, oral, Nightly PRN, FOR INSOMNIA    OneTouch Ultra Test strip 4 times daily    polyethylene glycol-electrolytes 420 gram solution oral       Allergies  Patient has no known allergies.    Review of Systems   Constitutional:  Positive for fatigue and unexpected weight change. Negative for appetite change and fever.   HENT:  Positive for dental problem. Negative for sore throat.         Denies dry mouth   Eyes:  Negative for visual disturbance.   Respiratory:  Negative for cough and shortness of breath.    Cardiovascular:  Negative for chest pain.   Gastrointestinal:  Negative for abdominal pain, constipation, diarrhea, nausea and vomiting.   Endocrine: Negative for polydipsia and polyuria.   Genitourinary:  Negative for frequency.   Musculoskeletal:  Positive for arthralgias and myalgias.   Skin:  Negative for rash.   Neurological:  Positive for dizziness. Negative for headaches.   Psychiatric/Behavioral:  Positive for dysphoric mood. The patient is nervous/anxious.         Memory loss         Last Recorded Vitals  Blood pressure 130/82, pulse 89, weight 103 kg (228 lb).    Physical Exam  Constitutional:       General: He is not in acute distress.  HENT:      Head: Normocephalic.      Mouth/Throat:      Mouth: Mucous membranes are moist.   Eyes:      Extraocular Movements:      Right eye: Abnormal extraocular motion (lateral deviation) present.   Neck:      Thyroid: No thyroid mass or thyromegaly.   Cardiovascular:      Pulses:           Radial pulses are 2+ on the right side and 2+ on the left side.   Musculoskeletal:      Right lower leg: No edema.      Left lower  leg: No edema.   Feet:      Comments: Flat feet  Lymphadenopathy:      Cervical: No cervical adenopathy.   Skin:     Comments: No foot sores   Neurological:      Mental Status: He is alert.      Motor: No tremor.   Psychiatric:         Mood and Affect: Affect normal.        Relevant Results  Glucose   Date Value   05/01/2024 156 mg/dL (H)   04/29/2024 311 mg/dL (H)   11/17/2022 173 MG/DL (H)   11/16/2022 89 MG/DL   08/15/2022 122 mg/dL (H)     Hemoglobin A1C (%)   Date Value   05/01/2024 7.9 (H)   08/15/2022 7.8 (A)   06/01/2021 8.5   05/25/2020 6.9 (H)   04/11/2020 8.7     Bicarbonate   Date Value   05/01/2024 30 mmol/L   04/29/2024 27 mmol/L   11/17/2022 22 MMOL/L (L)   11/16/2022 22 MMOL/L (L)   08/15/2022 26 mmol/L     Urea Nitrogen   Date Value   05/01/2024 9 mg/dL   04/29/2024 10 mg/dL   11/17/2022 15 MG/DL   11/16/2022 15 MG/DL   08/15/2022 13 mg/dL     Creatinine   Date Value   05/01/2024 0.88 mg/dL   04/29/2024 0.80 mg/dL   11/17/2022 0.9 MG/DL   11/16/2022 0.9 MG/DL   08/15/2022 0.88 mg/dL     Lab Results   Component Value Date    CHOL 143 05/01/2024    CHOL 242 (H) 08/15/2022    CHOL 140 09/03/2021     Lab Results   Component Value Date    HDL 49.4 05/01/2024    HDL 53.9 08/15/2022    HDL 41.4 09/03/2021     Lab Results   Component Value Date    LDLCALC 85 05/01/2024     Lab Results   Component Value Date    TRIG 45 05/01/2024    TRIG 78 08/15/2022    TRIG 80 06/01/2021     Lab Results   Component Value Date    TSH 1.18 05/01/2024     Lab Results   Component Value Date    ALBUR <7.0 06/14/2021    [id number] <14 06/14/2021          IMPRESSION  TYPE 1 DIABETES MELLITUS  Rapid A1c 6.9%  Glucose well controlled, overall  History of hypoglycemia on higher glargine dose.     HYPOTHYROIDISM    HYPERLIPIDEMIA      RECOMMENDATIONS  Continue current insulin dosing    Follow up 3 months

## 2024-09-17 NOTE — PATIENT INSTRUCTIONS
A1c 6.9%    RECOMMENDATIONS  Continue current insulin dosing    Follow up 3 months     good, to achieve stated therapy goals

## 2024-12-09 ENCOUNTER — APPOINTMENT (OUTPATIENT)
Dept: ENDOCRINOLOGY | Facility: CLINIC | Age: 49
End: 2024-12-09
Payer: COMMERCIAL

## 2024-12-09 VITALS
SYSTOLIC BLOOD PRESSURE: 118 MMHG | HEART RATE: 88 BPM | BODY MASS INDEX: 37.75 KG/M2 | WEIGHT: 241 LBS | DIASTOLIC BLOOD PRESSURE: 81 MMHG

## 2024-12-09 DIAGNOSIS — E78.5 HYPERLIPIDEMIA, UNSPECIFIED HYPERLIPIDEMIA TYPE: ICD-10-CM

## 2024-12-09 DIAGNOSIS — E10.9 TYPE 1 DIABETES MELLITUS WITHOUT COMPLICATION: Primary | ICD-10-CM

## 2024-12-09 DIAGNOSIS — E03.9 HYPOTHYROIDISM, UNSPECIFIED TYPE: ICD-10-CM

## 2024-12-09 LAB — POC HEMOGLOBIN A1C: 9.6 % (ref 4.2–6.5)

## 2024-12-09 PROCEDURE — 3051F HG A1C>EQUAL 7.0%<8.0%: CPT | Performed by: INTERNAL MEDICINE

## 2024-12-09 PROCEDURE — 99214 OFFICE O/P EST MOD 30 MIN: CPT | Performed by: INTERNAL MEDICINE

## 2024-12-09 PROCEDURE — 3074F SYST BP LT 130 MM HG: CPT | Performed by: INTERNAL MEDICINE

## 2024-12-09 PROCEDURE — 3048F LDL-C <100 MG/DL: CPT | Performed by: INTERNAL MEDICINE

## 2024-12-09 PROCEDURE — 1036F TOBACCO NON-USER: CPT | Performed by: INTERNAL MEDICINE

## 2024-12-09 PROCEDURE — 3062F POS MACROALBUMINURIA REV: CPT | Performed by: INTERNAL MEDICINE

## 2024-12-09 PROCEDURE — 83036 HEMOGLOBIN GLYCOSYLATED A1C: CPT | Performed by: INTERNAL MEDICINE

## 2024-12-09 PROCEDURE — 3079F DIAST BP 80-89 MM HG: CPT | Performed by: INTERNAL MEDICINE

## 2024-12-09 RX ORDER — HYDROXYZINE PAMOATE 25 MG/1
25-50 CAPSULE ORAL EVERY 6 HOURS PRN
COMMUNITY
Start: 2024-10-29

## 2024-12-09 ASSESSMENT — ENCOUNTER SYMPTOMS
CONSTIPATION: 0
NAUSEA: 0
ARTHRALGIAS: 1
COUGH: 0
HEADACHES: 0
APPETITE CHANGE: 0
SORE THROAT: 0
DIZZINESS: 1
DYSPHORIC MOOD: 1
DIARRHEA: 0
FATIGUE: 1
SHORTNESS OF BREATH: 0
VOMITING: 0
FREQUENCY: 0
FEVER: 0
UNEXPECTED WEIGHT CHANGE: 1
POLYDIPSIA: 0
MYALGIAS: 1
NERVOUS/ANXIOUS: 1
ABDOMINAL PAIN: 0

## 2024-12-09 NOTE — PATIENT INSTRUCTIONS
A1c 9.6%    RECOMMENDATIONS  Humalog  Before Breakfast 1 unit per 15 grams carb   Before Lunch 1 unit per 10 grams carb  Before Dinner 1 unit per 7 grams carb    Follow up 3 months  Bring written glucose record (2 weeks' worth) to all appointments.

## 2024-12-09 NOTE — PROGRESS NOTES
History Of Present Illness  Ron Connolly is a 49 y.o. male     Duration of type 1 diabetes mellitus:  38 years  Complications:  none      More hyperglycemic in September and October 2024.    Lantus increased to 40 and back to 38 units at bedtime.        Humalog  Before Breakfast 1 unit per 15 grams carb   Before Lunch 1 unit per 15 grams carb  Before Dinner 1 unit per 7 grams carb     Patient is testing glucose 4 times daily  Records reviewed, on file     Last eye exam:  1/23/24    Past Medical History  He has a past medical history of Acute upper respiratory infection, unspecified (10/21/2015), High cholesterol, Hypertension, Hypothyroid, Personal history of other diseases of the digestive system (12/11/2018), Personal history of other diseases of the respiratory system (01/18/2016), Personal history of other diseases of the respiratory system (02/18/2017), Personal history of other diseases of the respiratory system (02/16/2015), Personal history of other specified conditions (04/03/2018), and Type 1 diabetes (Multi).    Surgical History  He has a past surgical history that includes MR angio neck wo IV contrast (11/16/2022).     Social History  He reports that he has never smoked. He has never used smokeless tobacco. He reports that he does not drink alcohol and does not use drugs.    Family History  Family History   Problem Relation Name Age of Onset    Hyperlipidemia Mother      Hyperlipidemia Father      Bipolar disorder Brother      Alcohol abuse Other multiple family members        Medications  Current Outpatient Medications   Medication Instructions    acetaminophen (Tylenol) 500 mg tablet Take by mouth.    acetone, urine, test (Ketostix) strip USE AS DIRECTED for ketone testing if glucose over 250 mg/dl.    atorvastatin (LIPITOR) 40 mg, oral, Nightly    buPROPion XL (WELLBUTRIN XL) 150 mg, Daily before breakfast    escitalopram (LEXAPRO) 10 mg, oral, Daily    HumaLOG U-100 Insulin 8-10 Units,  subcutaneous, 3 times daily (morning, midday, late afternoon)    hydrOXYzine pamoate (VISTARIL) 25-50 mg, Every 6 hours PRN    Lantus U-100 Insulin 35 Units, subcutaneous, Every 24 hours, Take as directed per insulin instructions.    levothyroxine (SYNTHROID, LEVOXYL) 137 mcg, oral, Daily    LORazepam (Ativan) 0.5 mg tablet take one tablet by moth one time per day as needed for panic for 30 days    melatonin 5-10 mg, Nightly PRN    OneTouch Ultra Test strip 4 times daily    polyethylene glycol-electrolytes 420 gram solution oral       Allergies  Patient has no known allergies.    Review of Systems   Constitutional:  Positive for fatigue and unexpected weight change. Negative for appetite change and fever.   HENT:  Negative for sore throat.         Denies dry mouth   Eyes:  Negative for visual disturbance.   Respiratory:  Negative for cough and shortness of breath.    Cardiovascular:  Negative for chest pain.   Gastrointestinal:  Negative for abdominal pain, constipation, diarrhea, nausea and vomiting.   Endocrine: Negative for polydipsia and polyuria.   Genitourinary:  Negative for frequency.   Musculoskeletal:  Positive for arthralgias and myalgias.   Skin:  Negative for rash.   Neurological:  Positive for dizziness. Negative for headaches.   Psychiatric/Behavioral:  Positive for dysphoric mood. The patient is nervous/anxious.         Memory loss         Last Recorded Vitals  Blood pressure 118/81, pulse 88, weight 109 kg (241 lb).    Physical Exam  Constitutional:       General: He is not in acute distress.  HENT:      Head: Normocephalic.      Mouth/Throat:      Mouth: Mucous membranes are moist.   Eyes:      Extraocular Movements: Extraocular movements intact.   Neck:      Thyroid: No thyroid mass or thyromegaly.   Cardiovascular:      Pulses:           Radial pulses are 2+ on the right side and 2+ on the left side.   Musculoskeletal:      Right lower leg: No edema.      Left lower leg: No edema.   Feet:       Comments: Flat feet  Lymphadenopathy:      Cervical: No cervical adenopathy.   Skin:     Comments: No foot sores   Neurological:      Mental Status: He is alert.      Motor: No tremor.   Psychiatric:         Mood and Affect: Affect normal.          Relevant Results  Glucose   Date Value   05/01/2024 156 mg/dL (H)   04/29/2024 311 mg/dL (H)   11/17/2022 173 MG/DL (H)   11/16/2022 89 MG/DL   08/15/2022 122 mg/dL (H)     POC HEMOGLOBIN A1c (%)   Date Value   12/09/2024 9.6 (A)   08/26/2024 6.9 (A)     Hemoglobin A1C (%)   Date Value   05/01/2024 7.9 (H)   08/15/2022 7.8 (A)   06/01/2021 8.5   05/25/2020 6.9 (H)   04/11/2020 8.7     Bicarbonate   Date Value   05/01/2024 30 mmol/L   04/29/2024 27 mmol/L   11/17/2022 22 MMOL/L (L)   11/16/2022 22 MMOL/L (L)   08/15/2022 26 mmol/L     Urea Nitrogen   Date Value   05/01/2024 9 mg/dL   04/29/2024 10 mg/dL   11/17/2022 15 MG/DL   11/16/2022 15 MG/DL   08/15/2022 13 mg/dL     Creatinine   Date Value   05/01/2024 0.88 mg/dL   04/29/2024 0.80 mg/dL   11/17/2022 0.9 MG/DL   11/16/2022 0.9 MG/DL   08/15/2022 0.88 mg/dL     Lab Results   Component Value Date    CHOL 143 05/01/2024    CHOL 242 (H) 08/15/2022    CHOL 140 09/03/2021     Lab Results   Component Value Date    HDL 49.4 05/01/2024    HDL 53.9 08/15/2022    HDL 41.4 09/03/2021     Lab Results   Component Value Date    LDLCALC 85 05/01/2024     Lab Results   Component Value Date    TRIG 45 05/01/2024    TRIG 78 08/15/2022    TRIG 80 06/01/2021     Lab Results   Component Value Date    TSH 1.18 05/01/2024       IMPRESSION  TYPE 1 DIABETES MELLITUS   Rapid A1c 9.6%  A1c unexpectedly high for current results  Some Fasting hypoglycemia on Lantus at 40 units    HYPOTHYROIDISM    HYPERLIPIDEMIA      RECOMMENDATIONS  Humalog  Before Breakfast 1 unit per 15 grams carb   Before Lunch 1 unit per 10 grams carb  Before Dinner 1 unit per 7 grams carb    Follow up 3 months  Bring written glucose record (2 weeks' worth) to all  appointments.

## 2025-01-13 ENCOUNTER — APPOINTMENT (OUTPATIENT)
Dept: ORTHOPEDIC SURGERY | Facility: CLINIC | Age: 50
End: 2025-01-13
Payer: COMMERCIAL

## 2025-01-13 DIAGNOSIS — M79.605 LEFT LEG PAIN: ICD-10-CM

## 2025-01-14 ENCOUNTER — HOSPITAL ENCOUNTER (OUTPATIENT)
Dept: RADIOLOGY | Facility: HOSPITAL | Age: 50
Discharge: HOME | End: 2025-01-14
Payer: COMMERCIAL

## 2025-01-14 DIAGNOSIS — M79.605 LEFT LEG PAIN: ICD-10-CM

## 2025-01-14 PROCEDURE — 73562 X-RAY EXAM OF KNEE 3: CPT | Mod: LT

## 2025-01-14 PROCEDURE — 73562 X-RAY EXAM OF KNEE 3: CPT | Mod: LEFT SIDE | Performed by: RADIOLOGY

## 2025-01-14 PROCEDURE — 73502 X-RAY EXAM HIP UNI 2-3 VIEWS: CPT | Mod: LEFT SIDE | Performed by: RADIOLOGY

## 2025-01-14 PROCEDURE — 73502 X-RAY EXAM HIP UNI 2-3 VIEWS: CPT | Mod: LT

## 2025-01-16 ENCOUNTER — OFFICE VISIT (OUTPATIENT)
Dept: ORTHOPEDIC SURGERY | Facility: CLINIC | Age: 50
End: 2025-01-16
Payer: COMMERCIAL

## 2025-01-16 DIAGNOSIS — M76.32 ILIOTIBIAL BAND SYNDROME OF LEFT SIDE: ICD-10-CM

## 2025-01-16 DIAGNOSIS — M17.12 ARTHRITIS OF LEFT KNEE: ICD-10-CM

## 2025-01-16 DIAGNOSIS — M25.552 LEFT HIP PAIN: Primary | ICD-10-CM

## 2025-01-16 DIAGNOSIS — M67.952 TENDINOPATHY OF LEFT GLUTEUS MEDIUS: ICD-10-CM

## 2025-01-16 DIAGNOSIS — M16.12 PRIMARY OSTEOARTHRITIS OF LEFT HIP: ICD-10-CM

## 2025-01-16 PROCEDURE — 99214 OFFICE O/P EST MOD 30 MIN: CPT | Performed by: STUDENT IN AN ORGANIZED HEALTH CARE EDUCATION/TRAINING PROGRAM

## 2025-01-16 RX ORDER — MELOXICAM 15 MG/1
15 TABLET ORAL DAILY
Qty: 30 TABLET | Refills: 0 | Status: SHIPPED | OUTPATIENT
Start: 2025-01-16 | End: 2025-02-15

## 2025-01-16 NOTE — PROGRESS NOTES
Sports Medicine Office Note    Today's Date:  01/16/2025     HPI: Ron Connolly is a 49 y.o. *** who presents today for ***    ***    *** has no other complaints.    Physical Examination:     ***    Imaging:  Radiographs of the *** were reviewed and revealed ***.  The studies were reviewed by me personally in the office today.    === 01/14/25 ===    XR HIP LEFT WITH PELVIS WHEN PERFORMED 2 OR 3 VIEWS    - Impression -  1. Unremarkable left hip radiographs.    MACRO:  None.    Signed by: Mckenna Altamirano 1/15/2025 9:58 PM  Dictation workstation:   [id number]    Problem List Items Addressed This Visit    None  Visit Diagnoses         Codes    Left hip pain    -  Primary M25.552    Relevant Medications    meloxicam (Mobic) 15 mg tablet          Assessment and Plan:    We reviewed the exam and imaging findings and discussed the conservative and surgical treatment options. We agreed ***. Physical therapy referral *** provided and discussed the importance of regular home exercises.  Recommended prescription doses of Tylenol and encouraged use of ice or heat as needed for acute flares of pain.  Plan for follow-up in *** for re-evaluation, otherwise may follow-up sooner if any new concerns arise.  Discussed this plan with the patient who is understanding and agreeable.    **This note was dictated using Dragon speech recognition software and was not corrected for spelling or grammatical errors**.    Oscar Del Real DO  Primary Care Sports Medicine  [facility] [place]    without bony crepitus or step-off. The lateral joint line is nontender and without bony crepitus or step-off. Flexion & extension are full and symmetrical. Varus & valgus stress test at 0° and 30° of flexion, Lachman's, and posterior drawer are all negative.  Taylor's equivocal.  The opposite knee is nontender and stable. Gait is slightly antalgic, slightly painful, and tandem.     Imaging:  Radiographs of the left hip and left knee obtained 1/14/2025 were reviewed and revealed mild hip DJD, mild medial compartment and patellofemoral compartment DJD, otherwise no evidence of acute osseous abnormalities of the left hip or left knee.    The studies were reviewed by me personally in the office today.    Problem List Items Addressed This Visit    None  Visit Diagnoses         Codes    Left hip pain    -  Primary M25.552    Tendinopathy of left gluteus medius     M67.952    Relevant Orders    Referral to Physical Therapy    Iliotibial band syndrome of left side     M76.32    Relevant Orders    Referral to Physical Therapy    Primary osteoarthritis of left hip     M16.12    Relevant Orders    Referral to Physical Therapy    Arthritis of left knee     M17.12    Relevant Orders    Referral to Physical Therapy          Assessment and Plan:    We reviewed the exam and imaging findings and discussed the conservative and surgical treatment options. We agreed to start with conservative management of left lower extremity pain with mild hip and knee DJD with iliotibial band syndrome and gluteus medius tendinopathy. Physical therapy referral provided and discussed the importance of regular home exercises.  Recommended prescription doses of Tylenol, Voltaren gel over left knee and lateral aspect of the left hip, and encouraged use of ice or heat as needed for acute flares of pain.  Prescribed short course of meloxicam to be taken once daily with food for the next 10-14 days, then may switch to taking once daily with food as needed  for acute flares of pain.  Instructed him to avoid other NSAIDs when taking this medication.  Discussed with patient that he is not currently a good candidate for cortisone injection due to risk of hyperglycemia following cortisone injection with recent hemoglobin A1c of 9.6.  He should follow-up with his endocrinologist for further management of diabetes.  May consider cortisone injection in the future if diabetes is under better control and pain is not improving with current interventions.  Plan for follow-up in 2 months for re-evaluation, otherwise may follow-up sooner if any new concerns arise.  Discussed this plan with the patient who is understanding and agreeable.    **This note was dictated using Dragon speech recognition software and was not corrected for spelling or grammatical errors**.    Oscar Del Real DO  Primary Care Sports Medicine  [facility] [place]

## 2025-03-12 LAB
ALBUMIN SERPL-MCNC: 4.3 G/DL (ref 3.6–5.1)
ALBUMIN/CREAT UR: 3 MG/G CREAT
ALP SERPL-CCNC: 83 U/L (ref 36–130)
ALT SERPL-CCNC: 25 U/L (ref 9–46)
ANION GAP SERPL CALCULATED.4IONS-SCNC: 12 MMOL/L (CALC) (ref 7–17)
AST SERPL-CCNC: 19 U/L (ref 10–40)
BILIRUB SERPL-MCNC: 0.6 MG/DL (ref 0.2–1.2)
BUN SERPL-MCNC: 11 MG/DL (ref 7–25)
CALCIUM SERPL-MCNC: 9.8 MG/DL (ref 8.6–10.3)
CHLORIDE SERPL-SCNC: 107 MMOL/L (ref 98–110)
CHOLEST SERPL-MCNC: 170 MG/DL
CHOLEST/HDLC SERPL: 3 (CALC)
CO2 SERPL-SCNC: 24 MMOL/L (ref 20–32)
CREAT SERPL-MCNC: 1.01 MG/DL (ref 0.6–1.29)
CREAT UR-MCNC: 178 MG/DL (ref 20–320)
EGFRCR SERPLBLD CKD-EPI 2021: 92 ML/MIN/1.73M2
ERYTHROCYTE [DISTWIDTH] IN BLOOD BY AUTOMATED COUNT: 13 % (ref 11–15)
EST. AVERAGE GLUCOSE BLD GHB EST-MCNC: 177 MG/DL
EST. AVERAGE GLUCOSE BLD GHB EST-SCNC: 9.8 MMOL/L
GLUCOSE SERPL-MCNC: 91 MG/DL (ref 65–139)
HBA1C MFR BLD: 7.8 % OF TOTAL HGB
HCT VFR BLD AUTO: 54.8 % (ref 38.5–50)
HDLC SERPL-MCNC: 56 MG/DL
HGB BLD-MCNC: 18.1 G/DL (ref 13.2–17.1)
LDLC SERPL CALC-MCNC: 100 MG/DL (CALC)
MCH RBC QN AUTO: 30.1 PG (ref 27–33)
MCHC RBC AUTO-ENTMCNC: 33 G/DL (ref 32–36)
MCV RBC AUTO: 91.2 FL (ref 80–100)
MICROALBUMIN UR-MCNC: 0.6 MG/DL
NONHDLC SERPL-MCNC: 114 MG/DL (CALC)
PLATELET # BLD AUTO: 298 THOUSAND/UL (ref 140–400)
PMV BLD REES-ECKER: 9.8 FL (ref 7.5–12.5)
POTASSIUM SERPL-SCNC: 4.1 MMOL/L (ref 3.5–5.3)
PROT SERPL-MCNC: 6.7 G/DL (ref 6.1–8.1)
RBC # BLD AUTO: 6.01 MILLION/UL (ref 4.2–5.8)
SODIUM SERPL-SCNC: 143 MMOL/L (ref 135–146)
TRIGL SERPL-MCNC: 54 MG/DL
TSH SERPL-ACNC: 3.06 MIU/L (ref 0.4–4.5)
WBC # BLD AUTO: 6.2 THOUSAND/UL (ref 3.8–10.8)

## 2025-03-17 ENCOUNTER — APPOINTMENT (OUTPATIENT)
Dept: ENDOCRINOLOGY | Facility: CLINIC | Age: 50
End: 2025-03-17
Payer: COMMERCIAL

## 2025-03-17 VITALS
BODY MASS INDEX: 36.49 KG/M2 | WEIGHT: 233 LBS | DIASTOLIC BLOOD PRESSURE: 80 MMHG | SYSTOLIC BLOOD PRESSURE: 121 MMHG | HEART RATE: 88 BPM

## 2025-03-17 DIAGNOSIS — E03.9 HYPOTHYROIDISM, UNSPECIFIED TYPE: ICD-10-CM

## 2025-03-17 DIAGNOSIS — E10.9 TYPE 1 DIABETES MELLITUS WITHOUT COMPLICATION: Primary | ICD-10-CM

## 2025-03-17 PROCEDURE — 3079F DIAST BP 80-89 MM HG: CPT | Performed by: INTERNAL MEDICINE

## 2025-03-17 PROCEDURE — 99214 OFFICE O/P EST MOD 30 MIN: CPT | Performed by: INTERNAL MEDICINE

## 2025-03-17 PROCEDURE — 3074F SYST BP LT 130 MM HG: CPT | Performed by: INTERNAL MEDICINE

## 2025-03-17 ASSESSMENT — ENCOUNTER SYMPTOMS
ABDOMINAL PAIN: 0
FREQUENCY: 0
MYALGIAS: 1
DYSPHORIC MOOD: 1
ARTHRALGIAS: 1
NERVOUS/ANXIOUS: 1
FEVER: 0
DIARRHEA: 0
VOMITING: 0
COUGH: 0
APPETITE CHANGE: 0
CONSTIPATION: 0
FATIGUE: 1
UNEXPECTED WEIGHT CHANGE: 1
SHORTNESS OF BREATH: 0
POLYDIPSIA: 0
NAUSEA: 0
HEADACHES: 0
SORE THROAT: 0

## 2025-03-17 NOTE — LETTER
March 17, 2025     Yann Menchaca MD  [address] [place] [address]   [facility], [address]  [place] [address]    Patient: Ron Connolly   YOB: 1975   Date of Visit: 3/17/2025       Dear Dr. Yann Menchaca MD:    Thank you for referring Ron Connolly to me for evaluation. Below are my notes for this consultation.  If you have questions, please do not hesitate to call me. I look forward to following your patient along with you.       Sincerely,     Sky Macedo MD      CC: No Recipients  ______________________________________________________________________________________    History Of Present Illness  Ron Connolly is a 49 y.o. male     Duration of type 1 diabetes mellitus:  38 years  Complications:  none       Lantus 36 units at bedtime.        Humalog  Before Breakfast 1 unit per 15 grams carb   Before Lunch 1 unit per 10 grams carb  Before Dinner 1 unit per 7 grams carb     Patient is testing glucose 4 times daily  Records reviewed, on file     Last eye exam:  1/23/24  Scheduled 3/26/25    Past Medical History  He has a past medical history of Acute upper respiratory infection, unspecified (10/21/2015), High cholesterol, Hypertension, Hypothyroid, Personal history of other diseases of the digestive system (12/11/2018), Personal history of other diseases of the respiratory system (01/18/2016), Personal history of other diseases of the respiratory system (02/18/2017), Personal history of other diseases of the respiratory system (02/16/2015), Personal history of other specified conditions (04/03/2018), and Type 1 diabetes (Multi).    Surgical History  He has a past surgical history that includes MR angio neck wo IV contrast (11/16/2022).     Social History  He reports that he has never smoked. He has never used smokeless tobacco. He reports that he does not drink alcohol and does not use drugs.    Family History  Family History   Problem Relation Name Age of Onset   • Hyperlipidemia  Mother     • Hyperlipidemia Father     • Bipolar disorder Brother     • Alcohol abuse Other multiple family members        Medications  Current Outpatient Medications   Medication Instructions   • acetaminophen (Tylenol) 500 mg tablet Take by mouth.   • acetone, urine, test (Ketostix) strip USE AS DIRECTED for ketone testing if glucose over 250 mg/dl.   • atorvastatin (LIPITOR) 40 mg, oral, Nightly   • buPROPion XL (WELLBUTRIN XL) 150 mg, Daily before breakfast   • escitalopram (LEXAPRO) 10 mg, oral, Daily   • HumaLOG U-100 Insulin 8-10 Units, subcutaneous, 3 times daily (morning, midday, late afternoon)   • hydrOXYzine pamoate (VISTARIL) 25-50 mg, Every 6 hours PRN   • Lantus U-100 Insulin 35 Units, subcutaneous, Every 24 hours, Take as directed per insulin instructions.   • levothyroxine (SYNTHROID, LEVOXYL) 137 mcg, oral, Daily   • LORazepam (Ativan) 0.5 mg tablet take one tablet by moth one time per day as needed for panic for 30 days   • melatonin 5-10 mg, Nightly PRN   • OneTouch Ultra Test strip 4 times daily       Allergies  Patient has no known allergies.    Review of Systems   Constitutional:  Positive for fatigue and unexpected weight change (gain). Negative for appetite change and fever.   HENT:  Negative for sore throat.         Denies dry mouth   Eyes:  Negative for visual disturbance.   Respiratory:  Negative for cough and shortness of breath.    Cardiovascular:  Negative for chest pain.   Gastrointestinal:  Negative for abdominal pain, constipation, diarrhea, nausea and vomiting.   Endocrine: Negative for polydipsia and polyuria.   Genitourinary:  Negative for frequency.   Musculoskeletal:  Positive for arthralgias and myalgias.   Skin:  Negative for rash.   Neurological:  Negative for headaches.   Psychiatric/Behavioral:  Positive for dysphoric mood. The patient is nervous/anxious.         Memory loss         Last Recorded Vitals  Blood pressure 121/80, pulse 88, weight 106 kg (233 lb).    Physical  Exam  Constitutional:       General: He is not in acute distress.  HENT:      Head: Normocephalic.      Mouth/Throat:      Mouth: Mucous membranes are moist.   Eyes:      Extraocular Movements: Extraocular movements intact.   Neck:      Thyroid: No thyroid mass or thyromegaly.   Cardiovascular:      Pulses:           Radial pulses are 2+ on the right side and 2+ on the left side.   Musculoskeletal:      Right lower leg: No edema.      Left lower leg: No edema.      Right foot: No deformity.      Left foot: No deformity.   Lymphadenopathy:      Cervical: No cervical adenopathy.   Skin:     Comments: No foot sores   Neurological:      Mental Status: He is alert.      Motor: No tremor.   Psychiatric:         Mood and Affect: Affect normal.          Relevant Results  GLUCOSE (mg/dL)   Date Value   03/11/2025 91     Glucose   Date Value   05/01/2024 156 mg/dL (H)   04/29/2024 311 mg/dL (H)   11/17/2022 173 MG/DL (H)   11/16/2022 89 MG/DL   08/15/2022 122 mg/dL (H)     POC HEMOGLOBIN A1c (%)   Date Value   12/09/2024 9.6 (A)   08/26/2024 6.9 (A)     HEMOGLOBIN A1c (% of total Hgb)   Date Value   03/11/2025 7.8 (H)     Hemoglobin A1C (%)   Date Value   05/01/2024 7.9 (H)   08/15/2022 7.8 (A)   06/01/2021 8.5   05/25/2020 6.9 (H)   04/11/2020 8.7     CARBON DIOXIDE (mmol/L)   Date Value   03/11/2025 24     Bicarbonate   Date Value   05/01/2024 30 mmol/L   04/29/2024 27 mmol/L   11/17/2022 22 MMOL/L (L)   11/16/2022 22 MMOL/L (L)   08/15/2022 26 mmol/L     UREA NITROGEN (BUN) (mg/dL)   Date Value   03/11/2025 11     Urea Nitrogen   Date Value   05/01/2024 9 mg/dL   04/29/2024 10 mg/dL   11/17/2022 15 MG/DL   11/16/2022 15 MG/DL   08/15/2022 13 mg/dL     Creatinine   Date Value   05/01/2024 0.88 mg/dL   04/29/2024 0.80 mg/dL   11/17/2022 0.9 MG/DL   11/16/2022 0.9 MG/DL   08/15/2022 0.88 mg/dL     CREATININE (mg/dL)   Date Value   03/11/2025 1.01     Lab Results   Component Value Date    CHOL 170 03/11/2025    CHOL 143  05/01/2024    CHOL 242 (H) 08/15/2022     Lab Results   Component Value Date    HDL 56 03/11/2025    HDL 49.4 05/01/2024    HDL 53.9 08/15/2022     Lab Results   Component Value Date    LDLCALC 100 (H) 03/11/2025    LDLCALC 85 05/01/2024     Lab Results   Component Value Date    TRIG 54 03/11/2025    TRIG 45 05/01/2024    TRIG 78 08/15/2022     Lab Results   Component Value Date    TSH 3.06 03/11/2025       IMPRESSION  TYPE 1 DIABETES MELLITUS   A1c much improved  Fasting hyperglycemia    HYPOTHYROIDISM  Euthyroid on current replacement    HYPERLIPIDEMIA   mg/dl  Maximal atorvastatin dose.      RECOMMENDATIONS  Lantus 37 units at bedtime    Follow up 3 months  Bring written glucose record (2 weeks' worth) to all appointments.

## 2025-03-17 NOTE — PROGRESS NOTES
History Of Present Illness  Ron Connolly is a 49 y.o. male     Duration of type 1 diabetes mellitus:  38 years  Complications:  none       Lantus 36 units at bedtime.        Humalog  Before Breakfast 1 unit per 15 grams carb   Before Lunch 1 unit per 10 grams carb  Before Dinner 1 unit per 7 grams carb     Patient is testing glucose 4 times daily  Records reviewed, on file     Last eye exam:  1/23/24  Scheduled 3/26/25    Past Medical History  He has a past medical history of Acute upper respiratory infection, unspecified (10/21/2015), High cholesterol, Hypertension, Hypothyroid, Personal history of other diseases of the digestive system (12/11/2018), Personal history of other diseases of the respiratory system (01/18/2016), Personal history of other diseases of the respiratory system (02/18/2017), Personal history of other diseases of the respiratory system (02/16/2015), Personal history of other specified conditions (04/03/2018), and Type 1 diabetes (Multi).    Surgical History  He has a past surgical history that includes MR angio neck wo IV contrast (11/16/2022).     Social History  He reports that he has never smoked. He has never used smokeless tobacco. He reports that he does not drink alcohol and does not use drugs.    Family History  Family History   Problem Relation Name Age of Onset    Hyperlipidemia Mother      Hyperlipidemia Father      Bipolar disorder Brother      Alcohol abuse Other multiple family members        Medications  Current Outpatient Medications   Medication Instructions    acetaminophen (Tylenol) 500 mg tablet Take by mouth.    acetone, urine, test (Ketostix) strip USE AS DIRECTED for ketone testing if glucose over 250 mg/dl.    atorvastatin (LIPITOR) 40 mg, oral, Nightly    buPROPion XL (WELLBUTRIN XL) 150 mg, Daily before breakfast    escitalopram (LEXAPRO) 10 mg, oral, Daily    HumaLOG U-100 Insulin 8-10 Units, subcutaneous, 3 times daily (morning, midday, late afternoon)     hydrOXYzine pamoate (VISTARIL) 25-50 mg, Every 6 hours PRN    Lantus U-100 Insulin 35 Units, subcutaneous, Every 24 hours, Take as directed per insulin instructions.    levothyroxine (SYNTHROID, LEVOXYL) 137 mcg, oral, Daily    LORazepam (Ativan) 0.5 mg tablet take one tablet by moth one time per day as needed for panic for 30 days    melatonin 5-10 mg, Nightly PRN    OneTouch Ultra Test strip 4 times daily       Allergies  Patient has no known allergies.    Review of Systems   Constitutional:  Positive for fatigue and unexpected weight change (gain). Negative for appetite change and fever.   HENT:  Negative for sore throat.         Denies dry mouth   Eyes:  Negative for visual disturbance.   Respiratory:  Negative for cough and shortness of breath.    Cardiovascular:  Negative for chest pain.   Gastrointestinal:  Negative for abdominal pain, constipation, diarrhea, nausea and vomiting.   Endocrine: Negative for polydipsia and polyuria.   Genitourinary:  Negative for frequency.   Musculoskeletal:  Positive for arthralgias and myalgias.   Skin:  Negative for rash.   Neurological:  Negative for headaches.   Psychiatric/Behavioral:  Positive for dysphoric mood. The patient is nervous/anxious.         Memory loss         Last Recorded Vitals  Blood pressure 121/80, pulse 88, weight 106 kg (233 lb).    Physical Exam  Constitutional:       General: He is not in acute distress.  HENT:      Head: Normocephalic.      Mouth/Throat:      Mouth: Mucous membranes are moist.   Eyes:      Extraocular Movements: Extraocular movements intact.   Neck:      Thyroid: No thyroid mass or thyromegaly.   Cardiovascular:      Pulses:           Radial pulses are 2+ on the right side and 2+ on the left side.   Musculoskeletal:      Right lower leg: No edema.      Left lower leg: No edema.      Right foot: No deformity.      Left foot: No deformity.   Lymphadenopathy:      Cervical: No cervical adenopathy.   Skin:     Comments: No foot sores    Neurological:      Mental Status: He is alert.      Motor: No tremor.   Psychiatric:         Mood and Affect: Affect normal.          Relevant Results  GLUCOSE (mg/dL)   Date Value   03/11/2025 91     Glucose   Date Value   05/01/2024 156 mg/dL (H)   04/29/2024 311 mg/dL (H)   11/17/2022 173 MG/DL (H)   11/16/2022 89 MG/DL   08/15/2022 122 mg/dL (H)     POC HEMOGLOBIN A1c (%)   Date Value   12/09/2024 9.6 (A)   08/26/2024 6.9 (A)     HEMOGLOBIN A1c (% of total Hgb)   Date Value   03/11/2025 7.8 (H)     Hemoglobin A1C (%)   Date Value   05/01/2024 7.9 (H)   08/15/2022 7.8 (A)   06/01/2021 8.5   05/25/2020 6.9 (H)   04/11/2020 8.7     CARBON DIOXIDE (mmol/L)   Date Value   03/11/2025 24     Bicarbonate   Date Value   05/01/2024 30 mmol/L   04/29/2024 27 mmol/L   11/17/2022 22 MMOL/L (L)   11/16/2022 22 MMOL/L (L)   08/15/2022 26 mmol/L     UREA NITROGEN (BUN) (mg/dL)   Date Value   03/11/2025 11     Urea Nitrogen   Date Value   05/01/2024 9 mg/dL   04/29/2024 10 mg/dL   11/17/2022 15 MG/DL   11/16/2022 15 MG/DL   08/15/2022 13 mg/dL     Creatinine   Date Value   05/01/2024 0.88 mg/dL   04/29/2024 0.80 mg/dL   11/17/2022 0.9 MG/DL   11/16/2022 0.9 MG/DL   08/15/2022 0.88 mg/dL     CREATININE (mg/dL)   Date Value   03/11/2025 1.01     Lab Results   Component Value Date    CHOL 170 03/11/2025    CHOL 143 05/01/2024    CHOL 242 (H) 08/15/2022     Lab Results   Component Value Date    HDL 56 03/11/2025    HDL 49.4 05/01/2024    HDL 53.9 08/15/2022     Lab Results   Component Value Date    LDLCALC 100 (H) 03/11/2025    LDLCALC 85 05/01/2024     Lab Results   Component Value Date    TRIG 54 03/11/2025    TRIG 45 05/01/2024    TRIG 78 08/15/2022     Lab Results   Component Value Date    TSH 3.06 03/11/2025       IMPRESSION  TYPE 1 DIABETES MELLITUS   A1c much improved  Fasting hyperglycemia    HYPOTHYROIDISM  Euthyroid on current replacement    HYPERLIPIDEMIA   mg/dl  Maximal atorvastatin  dose.      RECOMMENDATIONS  Lantus 37 units at bedtime    Follow up 3 months  Bring written glucose record (2 weeks' worth) to all appointments.

## 2025-03-17 NOTE — PATIENT INSTRUCTIONS
RECOMMENDATIONS  Lantus 37 units at bedtime    Follow up 3 months  Bring written glucose record (2 weeks' worth) to all appointments.

## 2025-03-20 ENCOUNTER — OFFICE VISIT (OUTPATIENT)
Dept: ORTHOPEDIC SURGERY | Facility: CLINIC | Age: 50
End: 2025-03-20
Payer: COMMERCIAL

## 2025-03-20 VITALS — BODY MASS INDEX: 36.57 KG/M2 | HEIGHT: 67 IN | WEIGHT: 233 LBS

## 2025-03-20 DIAGNOSIS — M25.552 LEFT HIP PAIN: Primary | ICD-10-CM

## 2025-03-20 DIAGNOSIS — M16.12 PRIMARY OSTEOARTHRITIS OF LEFT HIP: ICD-10-CM

## 2025-03-20 DIAGNOSIS — M76.32 ILIOTIBIAL BAND SYNDROME OF LEFT SIDE: ICD-10-CM

## 2025-03-20 DIAGNOSIS — M67.952 TENDINOPATHY OF LEFT GLUTEUS MEDIUS: ICD-10-CM

## 2025-03-20 DIAGNOSIS — M17.12 ARTHRITIS OF LEFT KNEE: ICD-10-CM

## 2025-03-20 PROCEDURE — 3008F BODY MASS INDEX DOCD: CPT | Performed by: STUDENT IN AN ORGANIZED HEALTH CARE EDUCATION/TRAINING PROGRAM

## 2025-03-20 PROCEDURE — 99213 OFFICE O/P EST LOW 20 MIN: CPT | Performed by: STUDENT IN AN ORGANIZED HEALTH CARE EDUCATION/TRAINING PROGRAM

## 2025-03-20 ASSESSMENT — PAIN - FUNCTIONAL ASSESSMENT: PAIN_FUNCTIONAL_ASSESSMENT: 0-10

## 2025-03-20 ASSESSMENT — PAIN SCALES - GENERAL: PAINLEVEL_OUTOF10: 1

## 2025-03-20 ASSESSMENT — PAIN DESCRIPTION - DESCRIPTORS: DESCRIPTORS: SORE

## 2025-03-20 NOTE — PROGRESS NOTES
Sports Medicine Office Note    Today's Date:  03/20/2025     HPI: Ron Connolly is a 49 y.o. *** who presents today for ***    ***    *** has no other complaints.    Physical Examination:     ***    Imaging:  Radiographs of the *** were reviewed and revealed ***.  The studies were reviewed by me personally in the office today.    === 01/14/25 ===    XR HIP LEFT WITH PELVIS WHEN PERFORMED 2 OR 3 VIEWS    - Impression -  1. Unremarkable left hip radiographs.    MACRO:  None.    Signed by: Mckenna Altamirano 1/15/2025 9:58 PM  Dictation workstation:   [id number]    Problem List Items Addressed This Visit    None    Assessment and Plan:    We reviewed the exam and imaging findings and discussed the conservative and surgical treatment options. We agreed ***. Physical therapy referral *** provided and discussed the importance of regular home exercises.  Recommended prescription doses of Tylenol and encouraged use of ice or heat as needed for acute flares of pain.  Plan for follow-up in *** for re-evaluation, otherwise may follow-up sooner if any new concerns arise.  Discussed this plan with the patient who is understanding and agreeable.    **This note was dictated using Dragon speech recognition software and was not corrected for spelling or grammatical errors**.    Oscar Del Real DO  Primary Care Sports Medicine  [facility] [place]    over left greater trochanter and gluteal musculature/tendons.  There is minimal tenderness to palpation over left iliotibial band from proximal to distal insertions.  Davion positive.  Trendelenburg positive.  Hip strength is weak as compared to the opposite hip. The opposite hip is otherwise nontender and stable.     The left knee is without obvious signs of acute bony deformity, erythema, ecchymosis.  There is trace to grade 1 joint effusion.  The patella is without tenderness. Apprehension is negative with medial and lateral glide. Patella crepitus is positive. Patella grind is positive. The medial joint line is minimally tender and without bony crepitus or step-off. The lateral joint line is nontender and without bony crepitus or step-off. Flexion & extension are full and symmetrical. Varus & valgus stress test at 0° and 30° of flexion, Lachman's, and posterior drawer are all negative.  Taylor's equivocal.  The opposite knee is nontender and stable. Gait is slightly antalgic, slightly painful, and tandem.      Imaging:  Radiographs of the left hip and left knee obtained 1/14/2025 were reviewed and revealed mild hip DJD, mild medial compartment and patellofemoral compartment DJD, otherwise no evidence of acute osseous abnormalities of the left hip or left knee.     The studies were reviewed by me personally in the office today.    Problem List Items Addressed This Visit    None  Visit Diagnoses         Codes    Left hip pain    -  Primary M25.552    Tendinopathy of left gluteus medius     M67.952    Iliotibial band syndrome of left side     M76.32    Primary osteoarthritis of left hip     M16.12    Arthritis of left knee     M17.12          Assessment and Plan:    We reviewed the exam and imaging findings and discussed the conservative and surgical treatment options. We agreed to continue with conservative management at this time as he seems to be doing very well with current treatment plan.  He should follow-up  with physical therapy as scheduled and discussed the importance of continuing with regular home exercise program.  Recommended prescription doses of Tylenol, Voltaren gel, and encouraged use of ice or heat as needed for acute flares of pain.  Plan for follow-up as needed.  Discussed this plan with the patient who is understanding and agreeable.    **This note was dictated using Dragon speech recognition software and was not corrected for spelling or grammatical errors**.    Oscar Del Real DO  Primary Care Sports Medicine  [facility] [place]

## 2025-06-30 ENCOUNTER — APPOINTMENT (OUTPATIENT)
Dept: ENDOCRINOLOGY | Facility: CLINIC | Age: 50
End: 2025-06-30
Payer: COMMERCIAL

## 2025-06-30 VITALS
DIASTOLIC BLOOD PRESSURE: 79 MMHG | HEART RATE: 108 BPM | WEIGHT: 235 LBS | BODY MASS INDEX: 36.81 KG/M2 | SYSTOLIC BLOOD PRESSURE: 130 MMHG

## 2025-06-30 DIAGNOSIS — E10.9 TYPE 1 DIABETES MELLITUS WITHOUT COMPLICATION: Primary | ICD-10-CM

## 2025-06-30 DIAGNOSIS — E03.9 HYPOTHYROIDISM, UNSPECIFIED TYPE: ICD-10-CM

## 2025-06-30 LAB — POC HEMOGLOBIN A1C: 7.5 % (ref 4.2–6.5)

## 2025-06-30 PROCEDURE — 1036F TOBACCO NON-USER: CPT | Performed by: INTERNAL MEDICINE

## 2025-06-30 PROCEDURE — 99214 OFFICE O/P EST MOD 30 MIN: CPT | Performed by: INTERNAL MEDICINE

## 2025-06-30 PROCEDURE — 3078F DIAST BP <80 MM HG: CPT | Performed by: INTERNAL MEDICINE

## 2025-06-30 PROCEDURE — 3051F HG A1C>EQUAL 7.0%<8.0%: CPT | Performed by: INTERNAL MEDICINE

## 2025-06-30 PROCEDURE — 3075F SYST BP GE 130 - 139MM HG: CPT | Performed by: INTERNAL MEDICINE

## 2025-06-30 PROCEDURE — 83036 HEMOGLOBIN GLYCOSYLATED A1C: CPT | Performed by: INTERNAL MEDICINE

## 2025-06-30 RX ORDER — LEVOTHYROXINE SODIUM 137 UG/1
137 TABLET ORAL DAILY
Qty: 90 TABLET | Refills: 3 | Status: SHIPPED | OUTPATIENT
Start: 2025-06-30 | End: 2026-06-30

## 2025-06-30 RX ORDER — INSULIN LISPRO 100 [IU]/ML
8-10 INJECTION, SOLUTION INTRAVENOUS; SUBCUTANEOUS
Qty: 30 ML | Refills: 3 | Status: SHIPPED | OUTPATIENT
Start: 2025-06-30

## 2025-06-30 RX ORDER — INSULIN GLARGINE 100 [IU]/ML
37 INJECTION, SOLUTION SUBCUTANEOUS EVERY 24 HOURS
Qty: 40 ML | Refills: 3 | Status: SHIPPED | OUTPATIENT
Start: 2025-06-30

## 2025-06-30 ASSESSMENT — ENCOUNTER SYMPTOMS
NERVOUS/ANXIOUS: 1
SORE THROAT: 0
HEADACHES: 0
FREQUENCY: 0
NAUSEA: 0
CONSTIPATION: 0
APPETITE CHANGE: 0
MYALGIAS: 1
FEVER: 0
DYSPHORIC MOOD: 1
VOMITING: 0
DIARRHEA: 0
SHORTNESS OF BREATH: 0
UNEXPECTED WEIGHT CHANGE: 1
COUGH: 0
POLYDIPSIA: 0
ARTHRALGIAS: 1
ABDOMINAL PAIN: 0

## 2025-06-30 ASSESSMENT — PATIENT HEALTH QUESTIONNAIRE - PHQ9
2. FEELING DOWN, DEPRESSED OR HOPELESS: NOT AT ALL
1. LITTLE INTEREST OR PLEASURE IN DOING THINGS: NOT AT ALL
SUM OF ALL RESPONSES TO PHQ9 QUESTIONS 1 AND 2: 0

## 2025-06-30 ASSESSMENT — COLUMBIA-SUICIDE SEVERITY RATING SCALE - C-SSRS
1. IN THE PAST MONTH, HAVE YOU WISHED YOU WERE DEAD OR WISHED YOU COULD GO TO SLEEP AND NOT WAKE UP?: NO
2. HAVE YOU ACTUALLY HAD ANY THOUGHTS OF KILLING YOURSELF?: NO
6. HAVE YOU EVER DONE ANYTHING, STARTED TO DO ANYTHING, OR PREPARED TO DO ANYTHING TO END YOUR LIFE?: NO

## 2025-06-30 NOTE — PATIENT INSTRUCTIONS
A1c 7.5%      RECOMMENDATIONS  Continue current program    Follow up 3-4 months  A1c at next appointment  Bring written glucose record (2 weeks' worth) to all appointments.

## 2025-06-30 NOTE — PROGRESS NOTES
History Of Present Illness  Ron Connolly is a 49 y.o. male     Duration of type 1 diabetes mellitus:  38 years  Complications:  none       Interval history of physical therapy, with improved walking  Super Feet shoe inserts    Lantus 37 units at bedtime.        Humalog  Before Breakfast 1 unit per 15 grams carb   Before Lunch 1 unit per 10 grams carb  Before Dinner 1 unit per 7 grams carb     Patient is testing glucose 3 times daily  Records reviewed, on file     Last eye exam:   3/27/25    Hypothyroidism  Levothyroxine 137 mcg/day    Hyperlipidemia      Past Medical History  He has a past medical history of Acute upper respiratory infection, unspecified (10/21/2015), High cholesterol, Hypertension, Hypothyroid, Hypothyroidism, Personal history of other diseases of the digestive system (12/11/2018), Personal history of other diseases of the respiratory system (01/18/2016), Personal history of other diseases of the respiratory system (02/18/2017), Personal history of other diseases of the respiratory system (02/16/2015), Personal history of other specified conditions (04/03/2018), Type 1 diabetes (Multi), and Vitamin D deficiency.    Surgical History  He has a past surgical history that includes MR angio neck wo IV contrast (11/16/2022).     Social History  He reports that he has never smoked. He has never used smokeless tobacco. He reports that he does not drink alcohol and does not use drugs.    Family History  Family History[1]    Medications  Current Outpatient Medications   Medication Instructions    acetaminophen (Tylenol) 500 mg tablet Take by mouth.    acetone, urine, test (Ketostix) strip USE AS DIRECTED for ketone testing if glucose over 250 mg/dl.    atorvastatin (LIPITOR) 40 mg, oral, Nightly    buPROPion XL (WELLBUTRIN XL) 150 mg, Daily before breakfast    escitalopram (LEXAPRO) 10 mg, oral, Daily    HumaLOG U-100 Insulin 8-10 Units, subcutaneous, 3 times daily (morning, midday, late afternoon)     hydrOXYzine pamoate (VISTARIL) 25-50 mg, Every 6 hours PRN    Lantus U-100 Insulin 35 Units, subcutaneous, Every 24 hours, Take as directed per insulin instructions.    levothyroxine (SYNTHROID, LEVOXYL) 137 mcg, oral, Daily    LORazepam (Ativan) 0.5 mg tablet take one tablet by moth one time per day as needed for panic for 30 days    melatonin 5-10 mg, Nightly PRN    OneTouch Ultra Test strip 4 times daily       Allergies  Patient has no known allergies.    Review of Systems   Constitutional:  Positive for unexpected weight change. Negative for appetite change and fever.   HENT:  Negative for sore throat.         Denies dry mouth   Eyes:  Negative for visual disturbance.   Respiratory:  Negative for cough and shortness of breath.    Cardiovascular:  Negative for chest pain.   Gastrointestinal:  Negative for abdominal pain, constipation, diarrhea, nausea and vomiting.   Endocrine: Negative for polydipsia and polyuria.   Genitourinary:  Negative for frequency.   Musculoskeletal:  Positive for arthralgias and myalgias.   Skin:  Negative for rash.   Neurological:  Negative for headaches.   Psychiatric/Behavioral:  Positive for dysphoric mood. The patient is nervous/anxious.         Memory loss         Last Recorded Vitals  Blood pressure 130/79, pulse 108, weight 107 kg (235 lb).    Physical Exam  Constitutional:       General: He is not in acute distress.  HENT:      Head: Normocephalic.      Mouth/Throat:      Mouth: Mucous membranes are moist.   Neck:      Thyroid: No thyroid mass or thyromegaly.   Cardiovascular:      Pulses:           Radial pulses are 2+ on the right side and 2+ on the left side.   Musculoskeletal:      Right lower leg: No edema.      Left lower leg: No edema.   Feet:      Comments: Flat feet  Lymphadenopathy:      Cervical: No cervical adenopathy.   Skin:     Comments: No foot sores   Neurological:      Mental Status: He is alert.      Motor: No tremor.   Psychiatric:         Mood and Affect:  Affect normal.          Relevant Results  GLUCOSE (mg/dL)   Date Value   03/11/2025 91     Glucose   Date Value   05/01/2024 156 mg/dL (H)   04/29/2024 311 mg/dL (H)   11/17/2022 173 MG/DL (H)   11/16/2022 89 MG/DL   08/15/2022 122 mg/dL (H)     POC HEMOGLOBIN A1c (%)   Date Value   12/09/2024 9.6 (A)   08/26/2024 6.9 (A)     HEMOGLOBIN A1c (% of total Hgb)   Date Value   03/11/2025 7.8 (H)     Hemoglobin A1C (%)   Date Value   05/01/2024 7.9 (H)   08/15/2022 7.8 (A)   06/01/2021 8.5   05/25/2020 6.9 (H)   04/11/2020 8.7     CARBON DIOXIDE (mmol/L)   Date Value   03/11/2025 24     Bicarbonate   Date Value   05/01/2024 30 mmol/L   04/29/2024 27 mmol/L   11/17/2022 22 MMOL/L (L)   11/16/2022 22 MMOL/L (L)   08/15/2022 26 mmol/L     UREA NITROGEN (BUN) (mg/dL)   Date Value   03/11/2025 11     Urea Nitrogen   Date Value   05/01/2024 9 mg/dL   04/29/2024 10 mg/dL   11/17/2022 15 MG/DL   11/16/2022 15 MG/DL   08/15/2022 13 mg/dL     Creatinine   Date Value   05/01/2024 0.88 mg/dL   04/29/2024 0.80 mg/dL   11/17/2022 0.9 MG/DL   11/16/2022 0.9 MG/DL   08/15/2022 0.88 mg/dL     CREATININE (mg/dL)   Date Value   03/11/2025 1.01     Lab Results   Component Value Date    CHOL 170 03/11/2025    CHOL 143 05/01/2024    CHOL 242 (H) 08/15/2022     Lab Results   Component Value Date    HDL 56 03/11/2025    HDL 49.4 05/01/2024    HDL 53.9 08/15/2022     Lab Results   Component Value Date    LDLCALC 100 (H) 03/11/2025    LDLCALC 85 05/01/2024     Lab Results   Component Value Date    TRIG 54 03/11/2025    TRIG 45 05/01/2024    TRIG 78 08/15/2022     Lab Results   Component Value Date    TSH 3.06 03/11/2025       IMPRESSION  TYPE 1 DIABETES MELLITUS  Rapid A1c 7.5%  A1c improved  No frequent hypoglycemia    HYPOTHYROIDISM      RECOMMENDATIONS  Continue current program    Follow up 3-4 months  A1c at next appointment  Bring written glucose record (2 weeks' worth) to all appointments.            [1]   Family History  Problem Relation  Name Age of Onset    Hyperlipidemia Mother      Hyperlipidemia Father      Bipolar disorder Brother      Alcohol abuse Other multiple family members     Arthritis Mother Mom     Mental illness Brother Brother

## 2025-09-03 DIAGNOSIS — E78.5 HYPERLIPIDEMIA, UNSPECIFIED HYPERLIPIDEMIA TYPE: ICD-10-CM

## 2025-09-04 RX ORDER — ATORVASTATIN CALCIUM 40 MG/1
40 TABLET, FILM COATED ORAL NIGHTLY
Qty: 90 TABLET | Refills: 3 | Status: SHIPPED | OUTPATIENT
Start: 2025-09-04

## 2025-10-20 ENCOUNTER — APPOINTMENT (OUTPATIENT)
Dept: ENDOCRINOLOGY | Facility: CLINIC | Age: 50
End: 2025-10-20
Payer: COMMERCIAL